# Patient Record
Sex: FEMALE | Race: OTHER | HISPANIC OR LATINO
[De-identification: names, ages, dates, MRNs, and addresses within clinical notes are randomized per-mention and may not be internally consistent; named-entity substitution may affect disease eponyms.]

---

## 2017-08-10 ENCOUNTER — RX RENEWAL (OUTPATIENT)
Age: 37
End: 2017-08-10

## 2017-11-20 ENCOUNTER — RX RENEWAL (OUTPATIENT)
Age: 37
End: 2017-11-20

## 2018-05-17 ENCOUNTER — LABORATORY RESULT (OUTPATIENT)
Age: 38
End: 2018-05-17

## 2018-05-17 ENCOUNTER — APPOINTMENT (OUTPATIENT)
Dept: OBGYN | Facility: CLINIC | Age: 38
End: 2018-05-17
Payer: MEDICARE

## 2018-05-17 VITALS
BODY MASS INDEX: 19.31 KG/M2 | SYSTOLIC BLOOD PRESSURE: 116 MMHG | DIASTOLIC BLOOD PRESSURE: 70 MMHG | HEIGHT: 63 IN | WEIGHT: 109 LBS

## 2018-05-17 DIAGNOSIS — Z01.419 ENCOUNTER FOR GYNECOLOGICAL EXAMINATION (GENERAL) (ROUTINE) W/OUT ABNORMAL FINDINGS: ICD-10-CM

## 2018-05-17 DIAGNOSIS — R68.82 DECREASED LIBIDO: ICD-10-CM

## 2018-05-17 PROCEDURE — 99395 PREV VISIT EST AGE 18-39: CPT

## 2018-06-06 ENCOUNTER — APPOINTMENT (OUTPATIENT)
Dept: UROGYNECOLOGY | Facility: CLINIC | Age: 38
End: 2018-06-06
Payer: MEDICARE

## 2018-06-06 DIAGNOSIS — N76.3 SUBACUTE AND CHRONIC VULVITIS: ICD-10-CM

## 2018-06-06 DIAGNOSIS — N82.3 FISTULA OF VAGINA TO LARGE INTESTINE: ICD-10-CM

## 2018-06-06 DIAGNOSIS — K50.90 CROHN'S DISEASE, UNSPECIFIED, W/OUT COMPLICATIONS: ICD-10-CM

## 2018-06-06 DIAGNOSIS — O03.9 COMPLETE OR UNSPECIFIED SPONTANEOUS ABORTION W/OUT COMPLICATION: ICD-10-CM

## 2018-06-06 PROCEDURE — 99214 OFFICE O/P EST MOD 30 MIN: CPT

## 2018-07-10 ENCOUNTER — RX RENEWAL (OUTPATIENT)
Age: 38
End: 2018-07-10

## 2018-07-31 ENCOUNTER — RX RENEWAL (OUTPATIENT)
Age: 38
End: 2018-07-31

## 2018-12-13 ENCOUNTER — RX RENEWAL (OUTPATIENT)
Age: 38
End: 2018-12-13

## 2020-03-26 ENCOUNTER — APPOINTMENT (OUTPATIENT)
Dept: OBGYN | Facility: CLINIC | Age: 40
End: 2020-03-26

## 2020-12-16 PROBLEM — Z01.419 ENCOUNTER FOR GYNECOLOGICAL EXAMINATION WITH PAPANICOLAOU SMEAR OF CERVIX: Status: RESOLVED | Noted: 2018-05-17 | Resolved: 2020-12-16

## 2021-03-19 ENCOUNTER — APPOINTMENT (OUTPATIENT)
Dept: OBGYN | Facility: CLINIC | Age: 41
End: 2021-03-19
Payer: MEDICARE

## 2021-03-19 ENCOUNTER — INPATIENT (INPATIENT)
Facility: HOSPITAL | Age: 41
LOS: 3 days | Discharge: LEFT BEFORE DISCHARGE | DRG: 330 | End: 2021-03-23
Attending: STUDENT IN AN ORGANIZED HEALTH CARE EDUCATION/TRAINING PROGRAM | Admitting: STUDENT IN AN ORGANIZED HEALTH CARE EDUCATION/TRAINING PROGRAM
Payer: MEDICARE

## 2021-03-19 VITALS
BODY MASS INDEX: 21.97 KG/M2 | WEIGHT: 124 LBS | SYSTOLIC BLOOD PRESSURE: 108 MMHG | TEMPERATURE: 97.3 F | DIASTOLIC BLOOD PRESSURE: 60 MMHG

## 2021-03-19 VITALS
OXYGEN SATURATION: 99 % | RESPIRATION RATE: 20 BRPM | SYSTOLIC BLOOD PRESSURE: 127 MMHG | DIASTOLIC BLOOD PRESSURE: 81 MMHG | HEART RATE: 99 BPM | TEMPERATURE: 99 F | HEIGHT: 63 IN | WEIGHT: 125 LBS

## 2021-03-19 DIAGNOSIS — Z01.419 ENCOUNTER FOR GYNECOLOGICAL EXAMINATION (GENERAL) (ROUTINE) W/OUT ABNORMAL FINDINGS: ICD-10-CM

## 2021-03-19 DIAGNOSIS — N83.209 UNSPECIFIED OVARIAN CYST, UNSPECIFIED SIDE: ICD-10-CM

## 2021-03-19 DIAGNOSIS — N81.4 UTEROVAGINAL PROLAPSE, UNSPECIFIED: ICD-10-CM

## 2021-03-19 DIAGNOSIS — R92.2 INCONCLUSIVE MAMMOGRAM: ICD-10-CM

## 2021-03-19 DIAGNOSIS — L02.31 CUTANEOUS ABSCESS OF BUTTOCK: ICD-10-CM

## 2021-03-19 DIAGNOSIS — Z87.42 PERSONAL HISTORY OF OTHER DISEASES OF THE FEMALE GENITAL TRACT: ICD-10-CM

## 2021-03-19 DIAGNOSIS — K61.39 OTHER ISCHIORECTAL ABSCESS: ICD-10-CM

## 2021-03-19 LAB
ALBUMIN SERPL ELPH-MCNC: 3.8 G/DL — SIGNIFICANT CHANGE UP (ref 3.3–5)
ALP SERPL-CCNC: 86 U/L — SIGNIFICANT CHANGE UP (ref 40–120)
ALT FLD-CCNC: 16 U/L — SIGNIFICANT CHANGE UP (ref 10–45)
ANION GAP SERPL CALC-SCNC: 15 MMOL/L — SIGNIFICANT CHANGE UP (ref 5–17)
APPEARANCE UR: CLEAR — SIGNIFICANT CHANGE UP
AST SERPL-CCNC: 19 U/L — SIGNIFICANT CHANGE UP (ref 10–40)
BACTERIA # UR AUTO: NEGATIVE — SIGNIFICANT CHANGE UP
BASOPHILS # BLD AUTO: 0.08 K/UL — SIGNIFICANT CHANGE UP (ref 0–0.2)
BASOPHILS NFR BLD AUTO: 0.3 % — SIGNIFICANT CHANGE UP (ref 0–2)
BILIRUB SERPL-MCNC: 0.4 MG/DL — SIGNIFICANT CHANGE UP (ref 0.2–1.2)
BILIRUB UR-MCNC: NEGATIVE — SIGNIFICANT CHANGE UP
BLD GP AB SCN SERPL QL: NEGATIVE — SIGNIFICANT CHANGE UP
BUN SERPL-MCNC: 17 MG/DL — SIGNIFICANT CHANGE UP (ref 7–23)
CALCIUM SERPL-MCNC: 9 MG/DL — SIGNIFICANT CHANGE UP (ref 8.4–10.5)
CHLORIDE SERPL-SCNC: 102 MMOL/L — SIGNIFICANT CHANGE UP (ref 96–108)
CO2 SERPL-SCNC: 21 MMOL/L — LOW (ref 22–31)
COLOR SPEC: COLORLESS — SIGNIFICANT CHANGE UP
CREAT SERPL-MCNC: 0.83 MG/DL — SIGNIFICANT CHANGE UP (ref 0.5–1.3)
DIFF PNL FLD: ABNORMAL
EOSINOPHIL # BLD AUTO: 0.17 K/UL — SIGNIFICANT CHANGE UP (ref 0–0.5)
EOSINOPHIL NFR BLD AUTO: 0.7 % — SIGNIFICANT CHANGE UP (ref 0–6)
EPI CELLS # UR: 0 /HPF — SIGNIFICANT CHANGE UP
GLUCOSE SERPL-MCNC: 90 MG/DL — SIGNIFICANT CHANGE UP (ref 70–99)
GLUCOSE UR QL: NEGATIVE — SIGNIFICANT CHANGE UP
HCG SERPL-ACNC: <2 MIU/ML — SIGNIFICANT CHANGE UP
HCT VFR BLD CALC: 31.2 % — LOW (ref 34.5–45)
HGB BLD-MCNC: 8.9 G/DL — LOW (ref 11.5–15.5)
HYALINE CASTS # UR AUTO: 0 /LPF — SIGNIFICANT CHANGE UP (ref 0–2)
IMM GRANULOCYTES NFR BLD AUTO: 1.4 % — SIGNIFICANT CHANGE UP (ref 0–1.5)
KETONES UR-MCNC: NEGATIVE — SIGNIFICANT CHANGE UP
LEUKOCYTE ESTERASE UR-ACNC: NEGATIVE — SIGNIFICANT CHANGE UP
LYMPHOCYTES # BLD AUTO: 0.81 K/UL — LOW (ref 1–3.3)
LYMPHOCYTES # BLD AUTO: 3.5 % — LOW (ref 13–44)
MCHC RBC-ENTMCNC: 22.2 PG — LOW (ref 27–34)
MCHC RBC-ENTMCNC: 28.5 GM/DL — LOW (ref 32–36)
MCV RBC AUTO: 77.8 FL — LOW (ref 80–100)
MONOCYTES # BLD AUTO: 0.84 K/UL — SIGNIFICANT CHANGE UP (ref 0–0.9)
MONOCYTES NFR BLD AUTO: 3.6 % — SIGNIFICANT CHANGE UP (ref 2–14)
NEUTROPHILS # BLD AUTO: 20.84 K/UL — HIGH (ref 1.8–7.4)
NEUTROPHILS NFR BLD AUTO: 90.5 % — HIGH (ref 43–77)
NITRITE UR-MCNC: NEGATIVE — SIGNIFICANT CHANGE UP
NRBC # BLD: 0 /100 WBCS — SIGNIFICANT CHANGE UP (ref 0–0)
PH UR: 6 — SIGNIFICANT CHANGE UP (ref 5–8)
PLATELET # BLD AUTO: 389 K/UL — SIGNIFICANT CHANGE UP (ref 150–400)
POTASSIUM SERPL-MCNC: 3.6 MMOL/L — SIGNIFICANT CHANGE UP (ref 3.5–5.3)
POTASSIUM SERPL-SCNC: 3.6 MMOL/L — SIGNIFICANT CHANGE UP (ref 3.5–5.3)
PROT SERPL-MCNC: 7.5 G/DL — SIGNIFICANT CHANGE UP (ref 6–8.3)
PROT UR-MCNC: NEGATIVE — SIGNIFICANT CHANGE UP
RBC # BLD: 4.01 M/UL — SIGNIFICANT CHANGE UP (ref 3.8–5.2)
RBC # FLD: 17.7 % — HIGH (ref 10.3–14.5)
RBC CASTS # UR COMP ASSIST: 8 /HPF — HIGH (ref 0–4)
RH IG SCN BLD-IMP: NEGATIVE — SIGNIFICANT CHANGE UP
SARS-COV-2 RNA SPEC QL NAA+PROBE: SIGNIFICANT CHANGE UP
SODIUM SERPL-SCNC: 138 MMOL/L — SIGNIFICANT CHANGE UP (ref 135–145)
SP GR SPEC: 1.01 — LOW (ref 1.01–1.02)
UROBILINOGEN FLD QL: NEGATIVE — SIGNIFICANT CHANGE UP
WBC # BLD: 23.07 K/UL — HIGH (ref 3.8–10.5)
WBC # FLD AUTO: 23.07 K/UL — HIGH (ref 3.8–10.5)
WBC UR QL: 1 /HPF — SIGNIFICANT CHANGE UP (ref 0–5)

## 2021-03-19 PROCEDURE — 74177 CT ABD & PELVIS W/CONTRAST: CPT | Mod: 26,MA

## 2021-03-19 PROCEDURE — 99396 PREV VISIT EST AGE 40-64: CPT | Mod: 25

## 2021-03-19 PROCEDURE — 99222 1ST HOSP IP/OBS MODERATE 55: CPT

## 2021-03-19 PROCEDURE — 99285 EMERGENCY DEPT VISIT HI MDM: CPT

## 2021-03-19 PROCEDURE — 99072 ADDL SUPL MATRL&STAF TM PHE: CPT

## 2021-03-19 PROCEDURE — 99213 OFFICE O/P EST LOW 20 MIN: CPT | Mod: 25

## 2021-03-19 RX ORDER — CIPROFLOXACIN LACTATE 400MG/40ML
400 VIAL (ML) INTRAVENOUS ONCE
Refills: 0 | Status: COMPLETED | OUTPATIENT
Start: 2021-03-19 | End: 2021-03-19

## 2021-03-19 RX ORDER — METRONIDAZOLE 500 MG
500 TABLET ORAL EVERY 8 HOURS
Refills: 0 | Status: DISCONTINUED | OUTPATIENT
Start: 2021-03-19 | End: 2021-03-20

## 2021-03-19 RX ORDER — METRONIDAZOLE 500 MG
TABLET ORAL
Refills: 0 | Status: DISCONTINUED | OUTPATIENT
Start: 2021-03-19 | End: 2021-03-20

## 2021-03-19 RX ORDER — ENOXAPARIN SODIUM 100 MG/ML
40 INJECTION SUBCUTANEOUS DAILY
Refills: 0 | Status: DISCONTINUED | OUTPATIENT
Start: 2021-03-19 | End: 2021-03-20

## 2021-03-19 RX ORDER — CIPROFLOXACIN LACTATE 400MG/40ML
VIAL (ML) INTRAVENOUS
Refills: 0 | Status: DISCONTINUED | OUTPATIENT
Start: 2021-03-19 | End: 2021-03-20

## 2021-03-19 RX ORDER — METRONIDAZOLE 500 MG
500 TABLET ORAL ONCE
Refills: 0 | Status: COMPLETED | OUTPATIENT
Start: 2021-03-19 | End: 2021-03-19

## 2021-03-19 RX ORDER — INFLUENZA VIRUS VACCINE 15; 15; 15; 15 UG/.5ML; UG/.5ML; UG/.5ML; UG/.5ML
0.5 SUSPENSION INTRAMUSCULAR ONCE
Refills: 0 | Status: DISCONTINUED | OUTPATIENT
Start: 2021-03-19 | End: 2021-03-23

## 2021-03-19 RX ORDER — SODIUM CHLORIDE 9 MG/ML
1000 INJECTION, SOLUTION INTRAVENOUS
Refills: 0 | Status: DISCONTINUED | OUTPATIENT
Start: 2021-03-19 | End: 2021-03-20

## 2021-03-19 RX ORDER — CIPROFLOXACIN LACTATE 400MG/40ML
400 VIAL (ML) INTRAVENOUS EVERY 12 HOURS
Refills: 0 | Status: DISCONTINUED | OUTPATIENT
Start: 2021-03-20 | End: 2021-03-20

## 2021-03-19 RX ADMIN — Medication 200 MILLIGRAM(S): at 20:09

## 2021-03-19 RX ADMIN — ENOXAPARIN SODIUM 40 MILLIGRAM(S): 100 INJECTION SUBCUTANEOUS at 22:41

## 2021-03-19 RX ADMIN — Medication 100 MILLIGRAM(S): at 18:26

## 2021-03-19 NOTE — REVIEW OF SYSTEMS
[Negative] : Heme/Lymph [FreeTextEntry2] : + chills yesterday  [FreeTextEntry1] : +gluteal pain since yesterday, +fullness in vagina

## 2021-03-19 NOTE — ED ADULT NURSE NOTE - OBJECTIVE STATEMENT
40 year old female pt presented to the ED stating PMD sent for redness to right buttock "abscess", pt states redness and discomfort started about 4 weeks ago and getting progressively worse, denies any fever states chills last night, no nausea no vomiting no diarrhea, lung field cta, pt with a H/O Crohn's, pt is ambulatory A&OX3, no discharge noted, no bleeding

## 2021-03-19 NOTE — ED PROVIDER NOTE - CLINICAL SUMMARY MEDICAL DECISION MAKING FREE TEXT BOX
41 yo F with a pmhx of Crohn's disease present to the ED after being sent in by her OBGYN Dr. Hurtado. Patient has been having an abscess form over her buttock over the past month and has increased in size. VSS. PE significant for a 3cm ischiorectal abscess. will order labs, imaging, surg consult

## 2021-03-19 NOTE — H&P ADULT - NSHPPHYSICALEXAM_GEN_ALL_CORE
Vital Signs Last 24 Hrs  T(C): 37.3 (19 Mar 2021 10:57), Max: 37.3 (19 Mar 2021 10:57)  T(F): 99.1 (19 Mar 2021 10:57), Max: 99.1 (19 Mar 2021 10:57)  HR: 92 (19 Mar 2021 12:00) (92 - 99)  BP: 134/73 (19 Mar 2021 12:00) (127/81 - 134/73)  BP(mean): --  RR: 16 (19 Mar 2021 12:00) (16 - 20)  SpO2: 100% (19 Mar 2021 12:00) (99% - 100%)    Exam:  Gen: NAD, resting in bed, alert and responding appropriately  Resp: Airway patent, non-labored respirations  Abd: Soft, ND, NT, no rebound or guarding. Incisions c/d/i  Ext: No edema, WWP  Neuro: AAOx3, no focal deficits

## 2021-03-19 NOTE — H&P ADULT - NSHPLABSRESULTS_GEN_ALL_CORE
MEDICATIONS  (PRN):    LABS:                        8.9    23.07 )-----------( 389      ( 19 Mar 2021 13:10 )             31.2     03-19    138  |  102  |  17  ----------------------------<  90  3.6   |  21<L>  |  0.83    Ca    9.0      19 Mar 2021 13:10    TPro  7.5  /  Alb  3.8  /  TBili  0.4  /  DBili  x   /  AST  19  /  ALT  16  /  AlkPhos  86  03-19    PT/INR - ( 19 Mar 2021 15:49 )   PT: 17.3 sec;   INR: 1.47 ratio       < from: CT Abdomen and Pelvis w/ IV Cont (03.19.21 @ 14:55) >    FINDINGS:  LOWER CHEST: Within normal limits.    LIVER: Within normal limits.  BILE DUCTS: Normal caliber.  GALLBLADDER: Within normal limits.  SPLEEN: Within normal limits.  PANCREAS: Within normal limits.  ADRENALS: Within normal limits.  KIDNEYS/URETERS: Within normal limits.    BLADDER: Within normal limits.  REPRODUCTIVE ORGANS: Uterus and adnexa within normal limits. 1.3 cm right ovarian follicle.    BOWEL: No bowel obstruction. Total colectomy with ileal anal pull-through.    PERITONEUM: No ascites.  VESSELS: Within normal limits.  RETROPERITONEUM/LYMPH NODES: No lymphadenopathy.  ABDOMINAL WALL: Within the perirenal fat of the right buttocks there is a complex fluid and air collection with several narrow tracts. A primarily fluid-filled tract extends from the central perianal fat to the skin surface at the right gluteal fold and this measures approximately 7.0 x 1.7 cm (6:60). Another tract filled with air extends laterally and measures 2.4 x 1.1 cm (6:54).  Another tract extends cranially to the right levator ani muscle. This measures approximately 6.0 x 0.7 cm (6:50). The right levator ani muscle is mildly thickened. No involvement is seen within the intraperitoneal space adjacent to this at this time. A small tract also extends to the right lateral aspect of the lower anus. An anal fistula cannot be excluded.    BONES: Within normal limits.    IMPRESSION:    Within the fat of the right buttocks adjacent to the anus there is a ill-defined collection of fluid and air with at least 4 sinus tracts as described above. The presence of air suggests either a gas forming organism or fistulization to the anus and/or skin surface. This would be better evaluated with dedicated contrast-enhanced anal fistula MRI examination.      < end of copied text >

## 2021-03-19 NOTE — H&P ADULT - ASSESSMENT
40F with hx of Crohns, on prednisone , s/p total colectomy w IPAA presents with perianal abscess, with multiple fistula tracts on CT    Recommendation:  - Admit to colorectal surgery, Dr. Webster  - Added on for OR for EUS tomorrow  - Preop, COVID swab, pregnancy test  - Reg diet, NPOpMN  - consent to be obtained  - discussed with attending    [id number]   40F with hx of Crohns, on prednisone , s/p total colectomy w IPAA presents with perianal abscess, with multiple fistula tracts on CT    Recommendation:  - Admit to colorectal surgery, Dr. Webster  - Added on for OR for EUS tomorrow  - Preop, COVID swab, pregnancy test  - Reg diet, NPOpMN  - consent to be obtained  - GI consult for Crohn's management  - antibiotics with cipro, flagyl  - discussed with attending    [id number]   40F with hx of Crohns, on prednisone , s/p total colectomy w IPAA presents with perianal abscess, with multiple fistula tracts on CT    Recommendation:  - Admit to colorectal surgery, Dr. Webster  - Added on for OR for EUA tomorrow  - Preop, COVID swab, pregnancy test  - Reg diet, NPOpMN  - consent to be obtained  - GI consult for Crohn's management  - antibiotics with cipro, flagyl  - discussed with attending    [id number]

## 2021-03-19 NOTE — ED PROVIDER NOTE - NS ED ROS FT
GENERAL: no fever, chills  HEENT: no cough, congestion, odynophagia, dysphagia  CARDIAC: no chest pain, palpitations, lightheadedness  PULM: no dyspnea, wheezing   GI: no abdominal pain, nausea, vomiting, diarrhea, constipation, melena, hematochezia  : no urinary dysuria, frequency, incontinence, hematuria  NEURO: no headache, motor weakness, sensory changes  MSK: no joint or muscle pain  SKIN: (+) abscess   HEME: no active bleeding, bruising

## 2021-03-19 NOTE — PHYSICAL EXAM
[Examination Of The Breasts] : a normal appearance [No Masses] : no breast masses were palpable [Labia Majora] : normal [Labia Minora] : normal [Normal] : normal [Uterine Adnexae] : normal [FreeTextEntry1] : +right gluteal abscess, + induration, +erythema, appears to be coming to a head, no drainage seen, fullness   [FreeTextEntry4] : + stool in the vagina,  [FreeTextEntry6] : third degree prolapse

## 2021-03-19 NOTE — ED PROVIDER NOTE - PHYSICAL EXAMINATION
GENERAL: no acute distress, non-toxic appearing  HEAD: normocephalic, atraumatic  HEENT: normal conjunctiva, oral mucosa moist, neck supple  CARDIAC: regular rate and rhythm, normal S1 and S2,  no appreciable murmurs  PULM: clear to ascultation bilaterally, no crackles, rales, rhonchi, or wheezing    GI: abdomen nondistended, soft, nontender, no guarding or rebound tenderness, (+) 3cm ischiorectal abscess    : no CVA tenderness, no suprapubic tenderness  NEURO: alert and oriented x 3, normal speech, PERRLA, EOMI, no focal motor or sensory deficits, nonantalgic gait  MSK: no visible deformities, no peripheral edema, calf tenderness/redness/swelling  SKIN: no visible rashes, dry, well-perfused  PSYCH: appropriate mood and affect

## 2021-03-19 NOTE — PATIENT PROFILE ADULT - STATED REASON FOR ADMISSION
PRIMARY CARE CLINIC FOLLOW UP VISIT  Chief Complaint   Patient presents with   • Asthma     History of Present Illness     Asthma, moderate persistent, well-controlled  Needs a refill of her advair. Also needs albuterol for her nebulizer in preparation for winter time, hasn't needed to use it much.     Current Outpatient Medications   Medication Sig Dispense Refill   • fluticasone-salmeterol (ADVAIR) 100-50 MCG/DOSE AEROSOL POWDER, BREATH ACTIVATED Inhale 1 Puff by mouth every 12 hours.     • fluticasone-salmeterol (ADVAIR) 100-50 MCG/DOSE AEROSOL POWDER, BREATH ACTIVATED Inhale 1 Puff by mouth every 12 hours. 1 Inhaler 3   • albuterol (PROVENTIL) 2.5mg/3ml Nebu Soln solution for nebulization 3 mL by Nebulization route every four hours as needed for Shortness of Breath. 30 Bullet 1   • PROAIR  (90 Base) MCG/ACT Aero Soln inhalation aerosol INHALE TWO PUFFS BY MOUTH EVERY 6 HOURS AS NEEDED FOR SHORTNESS OF BREATH 3 Inhaler 1     No current facility-administered medications for this visit.      Past Medical History:   Diagnosis Date   • Asthma, moderate persistent, well-controlled 3/25/2015     Past Surgical History:   Procedure Laterality Date   • HYSTERECTOMY ROBOTIC  10/19/2011    Performed by SHERRY WHITE at SURGERY [facility] ORS   • CYSTOSCOPY  10/19/2011    Performed by SHERRY WHITE at SURGERY [facility] ORS   • VAGINAL PERINEAL EXAM REPAIR  10/19/2011    Performed by SHERRY WHITE at SURGERY [facility] ORS   • PRIMARY C SECTION      x2, (2000, 2003).     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 Has 2 daughters ages 15 and 18 as of 7/2018     Family History   Problem Relation Age of Onset   • Breast Cancer Paternal Grandmother         metastatic    •  "Hypertension Mother    • Cancer Maternal Grandfather    • Hypertension Maternal Grandfather      Family Status   Relation Name Status   • PGMo     • Mo  Alive   • Fa  Alive   • MGMo  Alive   • MGFa       Allergies: Patient has no known allergies.    ROS  As per HPI above. All other systems reviewed and negative.        Objective   /86   Pulse 80   Temp 36.4 °C (97.6 °F)   Resp 16   Ht 1.651 m (5' 5\")   Wt 77.7 kg (171 lb 6.4 oz)   SpO2 96%  Body mass index is 28.52 kg/m².    General: alert and oriented, pleasant, cooperative  HEENT: Normocephalic, atraumatic.   Psychiatric: appropriate mood and affect. Good insight and appropriate judgment     Assessment and Plan   The following treatment plan was discussed     1. Asthma, moderate persistent, well-controlled  Well controlled, given refill of generic advair and also albuterol nebulizer solution.     2. Hyperlipidemia, unspecified hyperlipidemia type    - Comp Metabolic Panel; Future  - CBC WITH DIFFERENTIAL; Future  - Lipid Profile; Future    3. Encounter for vitamin deficiency screening    - VITAMIN D,25 HYDROXY; Future      Does flu shot through her 's employer       Healthcare maintenance     Health Maintenance Due   Topic Date Due   • IMM INFLUENZA (1) 2019       No follow-ups on file.    Sincere Hurt MD  Internal Medicine  [facility]                   " ischiorectal abscess

## 2021-03-19 NOTE — ED CLERICAL - NS ED CLERK NOTE PRE-ARRIVAL INFORMATION; ADDITIONAL PRE-ARRIVAL INFORMATION
CC/Reason For referral:  hx Crohn's, rectal/vag fistula. presented for gluteal pain.  has a gluteal abcess and may need drainage  Preferred Consultant(if applicable):  Kamar Polanco  Who admits for you (if needed):  Do you have documents you would like to fax over?  Would you still like to speak to an ED attending?  please call after patient is seen

## 2021-03-19 NOTE — H&P ADULT - ATTENDING COMMENTS
Long standing history of Crohn's w/ known anovaginal fistula presenting now w/ concern for perirectal abscess after seeing her gynecologist. Previously tried on remicade, Humira and Ent w/ intolerance or reactions to them. Has been maintained on steroids for sometime (currently on 10mg a day of prednisone)    CT confirms suspicion of anal fistula and several tracts noted   Admit  Regular diet and NPO at midnight for OR tomorrow  OR tomorrow for EUA, seton placement  IV abx  GI consult to evaluate for any other medical options. Pt unable to afford things like Stelara so would like to ask GI whether or not there is a benefit in trying a 5-ASA, azathioprine or 6-MP    Te Webster MD  Attending Physician

## 2021-03-19 NOTE — H&P ADULT - HISTORY OF PRESENT ILLNESS
Patient is a 40y old  Female who presents with a chief complaint of R buttock swelling    HPI:  40 year old female with hx of Crohns (diagnosed ~20 years ago), on prednisone x 6 years (refractory to Humira, Entyvio, allergic to Remicade), s/p total colectomy w IPAA over 8 years ago at [place], known anovaginal fistula followed by Ob/Gyn Dr. Hurtado, sent from Ob/Gyn office for perianal abscess. Patient reports R gluteal swelling x 1 months, worse in past 2 days, associated with pain and pus from anus. Patient denies fevers, denies lightheadedness/dizziness, denies SOB/chest pain, denies nausea/vomiting, denies constipation/diarrhea

## 2021-03-19 NOTE — ED ADULT NURSE REASSESSMENT NOTE - NS ED NURSE REASSESS COMMENT FT1
Spoke to Jorge L from lab, after lab received specimen pt/ptt tube, they were unable to locate specimen. Manager aware. Will resend new specimen.

## 2021-03-19 NOTE — ED PROVIDER NOTE - OBJECTIVE STATEMENT
41 yo F with a pmhx of Crohn's disease present to the ED after being sent in by her OBGYN Dr. Hurtado. Patient has been having an abscess form over her buttock over the past month and has increased in size. She admits to pain on sitting down. She denies any bloody stools. She had most of her colon removed. She denies any CP, SOB, abdominal pain, fevers, chills, N/V/D.

## 2021-03-19 NOTE — DISCUSSION/SUMMARY
[FreeTextEntry1] : 41 y/o P2 LMP 2/25/21\par crohn's recovaginal fstula, prednisone 10mg qd GI at [place], Dr. Mehta\par +Gluteal abscess gluteal pain since yesterday, referred to ER [facility], advised colorectal evaluation (Dr. Jarvis), called ER to notify spoke with nurse Eliane \par Pap obtained\par Screening Mammogram \par Ovarian cyst, pelvic u/S\par Uterine prolapse, can cosnider hysterectomy however, poor surgical candidate, wouldn't recommedn pessarydue to R/V fistula \par Patient sent to ER. advised to call regarding follow up in ER  \par

## 2021-03-19 NOTE — ED PROVIDER NOTE - ATTENDING CONTRIBUTION TO CARE
[place] - 41yo F w hx of Crohn's disease, recto-vaginal fistulas, present to the ED after being sent in by her OBGYN Dr. Hurtado. Patient has been having an abscess form over her buttock over the past month and has increased in size. She admits to pain on sitting down. She denies any bloody stools. She had most of her colon removed. She denies any CP, SOB, abdominal pain, fevers, chills, N/V/D. VS wnl, well appearing, in NAD. Moist mucosae, pink conjunctivae. Neck supple, neuro grossly intact. Lungs clear, cardiac wnl, no JVD. Abdomen soft/NT, no CVAT. R buttock w 3cm diam abscess w pos fluctuance. No pedal edema, no calf TTP. Will get labs, CT r/o fistula, Surgery consult, likely w ill DC w abx and Sx f/u

## 2021-03-20 LAB
ANION GAP SERPL CALC-SCNC: 12 MMOL/L — SIGNIFICANT CHANGE UP (ref 5–17)
APTT BLD: 34.1 SEC — SIGNIFICANT CHANGE UP (ref 27.5–35.5)
BUN SERPL-MCNC: 16 MG/DL — SIGNIFICANT CHANGE UP (ref 7–23)
CALCIUM SERPL-MCNC: 9.1 MG/DL — SIGNIFICANT CHANGE UP (ref 8.4–10.5)
CHLORIDE SERPL-SCNC: 104 MMOL/L — SIGNIFICANT CHANGE UP (ref 96–108)
CO2 SERPL-SCNC: 21 MMOL/L — LOW (ref 22–31)
COVID-19 SPIKE DOMAIN AB INTERP: POSITIVE
COVID-19 SPIKE DOMAIN ANTIBODY RESULT: >250 U/ML — HIGH
CREAT SERPL-MCNC: 0.89 MG/DL — SIGNIFICANT CHANGE UP (ref 0.5–1.3)
GLUCOSE SERPL-MCNC: 85 MG/DL — SIGNIFICANT CHANGE UP (ref 70–99)
HCG UR QL: NEGATIVE — SIGNIFICANT CHANGE UP
HCT VFR BLD CALC: 29.7 % — LOW (ref 34.5–45)
HGB BLD-MCNC: 8.9 G/DL — LOW (ref 11.5–15.5)
INR BLD: 1.58 RATIO — HIGH (ref 0.88–1.16)
MAGNESIUM SERPL-MCNC: 1.8 MG/DL — SIGNIFICANT CHANGE UP (ref 1.6–2.6)
MCHC RBC-ENTMCNC: 23 PG — LOW (ref 27–34)
MCHC RBC-ENTMCNC: 30 GM/DL — LOW (ref 32–36)
MCV RBC AUTO: 76.7 FL — LOW (ref 80–100)
NRBC # BLD: 0 /100 WBCS — SIGNIFICANT CHANGE UP (ref 0–0)
PHOSPHATE SERPL-MCNC: 2.9 MG/DL — SIGNIFICANT CHANGE UP (ref 2.5–4.5)
PLATELET # BLD AUTO: 372 K/UL — SIGNIFICANT CHANGE UP (ref 150–400)
POTASSIUM SERPL-MCNC: 3.9 MMOL/L — SIGNIFICANT CHANGE UP (ref 3.5–5.3)
POTASSIUM SERPL-SCNC: 3.9 MMOL/L — SIGNIFICANT CHANGE UP (ref 3.5–5.3)
PROTHROM AB SERPL-ACNC: 18.5 SEC — HIGH (ref 10.6–13.6)
RBC # BLD: 3.87 M/UL — SIGNIFICANT CHANGE UP (ref 3.8–5.2)
RBC # FLD: 17.6 % — HIGH (ref 10.3–14.5)
SARS-COV-2 IGG+IGM SERPL QL IA: >250 U/ML — HIGH
SARS-COV-2 IGG+IGM SERPL QL IA: POSITIVE
SODIUM SERPL-SCNC: 137 MMOL/L — SIGNIFICANT CHANGE UP (ref 135–145)
WBC # BLD: 18.8 K/UL — HIGH (ref 3.8–10.5)
WBC # FLD AUTO: 18.8 K/UL — HIGH (ref 3.8–10.5)

## 2021-03-20 PROCEDURE — 99222 1ST HOSP IP/OBS MODERATE 55: CPT

## 2021-03-20 RX ORDER — CIPROFLOXACIN LACTATE 400MG/40ML
500 VIAL (ML) INTRAVENOUS EVERY 12 HOURS
Refills: 0 | Status: DISCONTINUED | OUTPATIENT
Start: 2021-03-20 | End: 2021-03-21

## 2021-03-20 RX ORDER — OXYCODONE HYDROCHLORIDE 5 MG/1
5 TABLET ORAL EVERY 4 HOURS
Refills: 0 | Status: DISCONTINUED | OUTPATIENT
Start: 2021-03-20 | End: 2021-03-23

## 2021-03-20 RX ORDER — ENOXAPARIN SODIUM 100 MG/ML
40 INJECTION SUBCUTANEOUS DAILY
Refills: 0 | Status: DISCONTINUED | OUTPATIENT
Start: 2021-03-20 | End: 2021-03-23

## 2021-03-20 RX ORDER — ACETAMINOPHEN 500 MG
650 TABLET ORAL EVERY 6 HOURS
Refills: 0 | Status: DISCONTINUED | OUTPATIENT
Start: 2021-03-20 | End: 2021-03-23

## 2021-03-20 RX ORDER — ZINC OXIDE 200 MG/G
1 OINTMENT TOPICAL DAILY
Refills: 0 | Status: DISCONTINUED | OUTPATIENT
Start: 2021-03-20 | End: 2021-03-23

## 2021-03-20 RX ORDER — METRONIDAZOLE 500 MG
500 TABLET ORAL EVERY 8 HOURS
Refills: 0 | Status: DISCONTINUED | OUTPATIENT
Start: 2021-03-20 | End: 2021-03-21

## 2021-03-20 RX ADMIN — Medication 650 MILLIGRAM(S): at 21:47

## 2021-03-20 RX ADMIN — Medication 500 MILLIGRAM(S): at 17:55

## 2021-03-20 RX ADMIN — Medication 650 MILLIGRAM(S): at 21:17

## 2021-03-20 RX ADMIN — Medication 10 MILLIGRAM(S): at 05:12

## 2021-03-20 RX ADMIN — SODIUM CHLORIDE 75 MILLILITER(S): 9 INJECTION, SOLUTION INTRAVENOUS at 00:00

## 2021-03-20 RX ADMIN — ENOXAPARIN SODIUM 40 MILLIGRAM(S): 100 INJECTION SUBCUTANEOUS at 12:10

## 2021-03-20 RX ADMIN — Medication 500 MILLIGRAM(S): at 14:11

## 2021-03-20 RX ADMIN — Medication 500 MILLIGRAM(S): at 21:17

## 2021-03-20 RX ADMIN — Medication 500 MILLIGRAM(S): at 02:26

## 2021-03-20 RX ADMIN — ZINC OXIDE 1 APPLICATION(S): 200 OINTMENT TOPICAL at 14:11

## 2021-03-20 RX ADMIN — Medication 200 MILLIGRAM(S): at 07:57

## 2021-03-20 NOTE — PRE-ANESTHESIA EVALUATION ADULT - NSANTHASARD_GEN_ALL_CORE
Interventional Radiology Post Paracentesis/Thoracentesis Note    6/11/2018    Procedure(s): Ultrasound guided Therapeutic Paracentesis Performed with 8 Luxembourgish catheter total volume 6,000 ml. Preliminary Findings: large cloudy and tan, chylous. Complications: None    Plan:  Observation, check labs if drawn.           Chest X-Ray:  no    Full dictated report to follow    Signed By: Kwan Ramirez 2

## 2021-03-20 NOTE — PROGRESS NOTE ADULT - SUBJECTIVE AND OBJECTIVE BOX
Subjective:   Patient seen at bedside this AM. Reports feeling well, without complaints. Denies chest pain, SOB.     24h Events:   - Overnight, no acute events    Objective:  Vital Signs  T(C): 36.6 (03-20 @ 13:40), Max: 37.2 (03-20 @ 05:48)  HR: 60 (03-20 @ 13:40) (60 - 111)  BP: 104/60 (03-20 @ 13:40) (104/60 - 123/77)  RR: 17 (03-20 @ 13:40) (14 - 18)  SpO2: 97% (03-20 @ 13:40) (96% - 100%)  03-19-21 @ 07:01  -  03-20-21 @ 07:00  --------------------------------------------------------  IN:  Total IN: 0 mL    OUT:    Voided (mL): 400 mL  Total OUT: 400 mL    Total NET: -400 mL      03-20-21 @ 07:01  -  03-20-21 @ 14:11  --------------------------------------------------------  IN:  Total IN: 0 mL    OUT:    Voided (mL): 300 mL  Total OUT: 300 mL    Total NET: -300 mL      Physical Exam:  GEN: resting in bed comfortably in NAD  RESP: no increased WOB  ABD: soft, non-distended, non-tender without rebound tenderness or guarding  EXTR: warm, well-perfused without gross deformities; spontaneous movement in b/l U/L extrem  NEURO: awake, alert      Labs:             8.9    18.80 )-----------( 372      ( 20 Mar 2021 06:15 )             29.7   03-20    137  |  104  |  16  ----------------------------<  85  3.9   |  21<L>  |  0.89    Ca    9.1      20 Mar 2021 06:14  Phos  2.9     03-20  Mg     1.8     03-20    TPro  7.5  /  Alb  3.8  /  TBili  0.4  /  DBili  x   /  AST  19  /  ALT  16  /  AlkPhos  86  03-19      Medications:   MEDICATIONS  (STANDING):  acetaminophen   Tablet .. 650 milliGRAM(s) Oral every 6 hours  ciprofloxacin     Tablet 500 milliGRAM(s) Oral every 12 hours  enoxaparin Injectable 40 milliGRAM(s) SubCutaneous daily  influenza   Vaccine 0.5 milliLiter(s) IntraMuscular once  metroNIDAZOLE    Tablet 500 milliGRAM(s) Oral every 8 hours  predniSONE   Tablet 10 milliGRAM(s) Oral daily  zinc oxide 20% Ointment 1 Application(s) Topical daily    MEDICATIONS  (PRN):  oxyCODONE    IR 5 milliGRAM(s) Oral every 4 hours PRN Severe Pain (7 - 10)      Assessment:   41yo Female with Hx of Crohn's (on prednisone) s/p total colectomy with IPAA who presents with perianal abscess. OR today for EUA.       Plan:   - OR today for EUA   - NPO for OR / IVF  - GI c/s today for Crohn's mgmt  - Abx: Cipro, Flagyl  - Lovenox for VTE ppx      Makenzie Oro, PGY-1  Red Surgery  #[id number] Subjective:   Patient seen at bedside this AM. Reports feeling well, without complaints. Denies chest pain, SOB.     24h Events:   - Overnight, no acute events    Objective:  Vital Signs  T(C): 36.6 (03-20 @ 13:40), Max: 37.2 (03-20 @ 05:48)  HR: 60 (03-20 @ 13:40) (60 - 111)  BP: 104/60 (03-20 @ 13:40) (104/60 - 123/77)  RR: 17 (03-20 @ 13:40) (14 - 18)  SpO2: 97% (03-20 @ 13:40) (96% - 100%)  03-19-21 @ 07:01  -  03-20-21 @ 07:00  --------------------------------------------------------  IN:  Total IN: 0 mL    OUT:    Voided (mL): 400 mL  Total OUT: 400 mL    Total NET: -400 mL      03-20-21 @ 07:01  -  03-20-21 @ 14:11  --------------------------------------------------------  IN:  Total IN: 0 mL    OUT:    Voided (mL): 300 mL  Total OUT: 300 mL    Total NET: -300 mL      Physical Exam:  GEN: resting in bed comfortably in NAD  RESP: no increased WOB  ABD: soft, non-distended, non-tender without rebound tenderness or guarding  EXTR: warm, well-perfused without gross deformities; spontaneous movement in b/l U/L extrem  NEURO: awake, alert      Labs:             8.9    18.80 )-----------( 372      ( 20 Mar 2021 06:15 )             29.7   03-20    137  |  104  |  16  ----------------------------<  85  3.9   |  21<L>  |  0.89    Ca    9.1      20 Mar 2021 06:14  Phos  2.9     03-20  Mg     1.8     03-20    TPro  7.5  /  Alb  3.8  /  TBili  0.4  /  DBili  x   /  AST  19  /  ALT  16  /  AlkPhos  86  03-19      Medications:   MEDICATIONS  (STANDING):  acetaminophen   Tablet .. 650 milliGRAM(s) Oral every 6 hours  ciprofloxacin     Tablet 500 milliGRAM(s) Oral every 12 hours  enoxaparin Injectable 40 milliGRAM(s) SubCutaneous daily  influenza   Vaccine 0.5 milliLiter(s) IntraMuscular once  metroNIDAZOLE    Tablet 500 milliGRAM(s) Oral every 8 hours  predniSONE   Tablet 10 milliGRAM(s) Oral daily  zinc oxide 20% Ointment 1 Application(s) Topical daily    MEDICATIONS  (PRN):  oxyCODONE    IR 5 milliGRAM(s) Oral every 4 hours PRN Severe Pain (7 - 10)      Assessment:   41yo Female with Hx of Crohn's (on prednisone) s/p total colectomy with IPAA who presents with perianal abscess. OR today for EUA.       Plan:   - OR today for EUA   - NPO for OR / IVF  - Abx: Cipro, Flagyl  - Pain control PRN: Tylenol, Ox 2.5/5  - Home meds: prednisone  - Lovenox for VTE ppx  - GI c/s today for Crohn's mgmt      Makenzie Oro, PGY-1  Red Surgery  #[id number]

## 2021-03-20 NOTE — CONSULT NOTE ADULT - SUBJECTIVE AND OBJECTIVE BOX
Chief Complaint:  Patient is a 40y old  Female who presents with a chief complaint of Perianal abscess (20 Mar 2021 08:11)      HPI:BING FIGUEROA is a 40y Female w/ hx of Crohn's disease presenting from OB/Gyn clinic for perianal abscess. Pt dx w/ Crohn's > 20 years ago. She was initially on prednisone, then tried remicade but had allergic reaction, was on Humira for 6 years but ended up with total colectomy w/ IPAA while on Humira, tried Entivyo while she was pregnant w/o improvement, and has now been on prednisone 10-20mg per day for the last 6 years. She follows w/ [facility] and is now on prednisone 10mg daily. She has a known anovaginal fistula.    She reported 1 month of swellling on her buttock that over the last 2-3 days developed into an abscess. She reports pain and some anal pus drainage. She denies f/c. She overall is feeling well - she denies n/v, abd pain, change in bowel habits, melena, hematochezia.     She underwent EUA with colorectal surgery today with 3 setons placed in separate fistula tracts, one communicating with the vagina.    PMHX/PSHX:  Crohn disease      Allergies:  Remicade (Short breath)      Home Medications: reviewed  Hospital Medications:  acetaminophen   Tablet .. 650 milliGRAM(s) Oral every 6 hours  ciprofloxacin     Tablet 500 milliGRAM(s) Oral every 12 hours  enoxaparin Injectable 40 milliGRAM(s) SubCutaneous daily  influenza   Vaccine 0.5 milliLiter(s) IntraMuscular once  metroNIDAZOLE    Tablet 500 milliGRAM(s) Oral every 8 hours  oxyCODONE    IR 5 milliGRAM(s) Oral every 4 hours PRN  predniSONE   Tablet 10 milliGRAM(s) Oral daily  zinc oxide 20% Ointment 1 Application(s) Topical daily      Social History:   Tob: Denies  EtOH: Denies  Illicit Drugs: Denies    Family history:    Denies family history of colon cancer/polyps, stomach cancer/polyps, pancreatic cancer/masses, liver cancer/disease, ovarian cancer and endometrial cancer.    ROS:   General:  No  fevers, chills, night sweats, fatigue  Eyes:  Good vision, no reported pain  ENT:  No sore throat, pain, runny nose  CV:  No pain, palpitations  Pulm:  No dyspnea, cough  GI:  See HPI, otherwise negative  :  No  incontinence, nocturia  Muscle:  No pain, weakness  Neuro:  No memory problems  Psych:  No insomnia, mood problems, depression  Endocrine:  No polyuria, polydipsia, cold/heat intolerance  Heme:  No petechiae, ecchymosis, easy bruisability  Skin:  No rash    PHYSICAL EXAM:   Vital Signs:  Vital Signs Last 24 Hrs  T(C): 36.7 (20 Mar 2021 14:39), Max: 37.2 (20 Mar 2021 05:48)  T(F): 98.1 (20 Mar 2021 14:39), Max: 98.9 (20 Mar 2021 05:48)  HR: 70 (20 Mar 2021 14:39) (60 - 111)  BP: 95/61 (20 Mar 2021 14:39) (95/61 - 123/77)  BP(mean): 76 (20 Mar 2021 11:45) (76 - 84)  RR: 17 (20 Mar 2021 14:39) (14 - 18)  SpO2: 98% (20 Mar 2021 14:39) (96% - 100%)  Daily Height in cm: 160.02 (20 Mar 2021 08:28)    Daily     GENERAL: no acute distress  NEURO: alert  HEENT: anicteric sclera, no conjunctival pallor appreciated  CHEST: no respiratory distress, no accessory muscle use  CARDIAC: regular rate, rhythm  ABDOMEN: soft, non-tender, non-distended, no rebound or guarding  EXTREMITIES: warm, well perfused, no edema  SKIN: no lesions noted    LABS: reviewed                        8.9    18.80 )-----------( 372      ( 20 Mar 2021 06:15 )             29.7     03-20    137  |  104  |  16  ----------------------------<  85  3.9   |  21<L>  |  0.89    Ca    9.1      20 Mar 2021 06:14  Phos  2.9     03-20  Mg     1.8     03-20    TPro  7.5  /  Alb  3.8  /  TBili  0.4  /  DBili  x   /  AST  19  /  ALT  16  /  AlkPhos  86  03-19    LIVER FUNCTIONS - ( 19 Mar 2021 13:10 )  Alb: 3.8 g/dL / Pro: 7.5 g/dL / ALK PHOS: 86 U/L / ALT: 16 U/L / AST: 19 U/L / GGT: x               Diagnostic Studies: see sunrise for full report

## 2021-03-20 NOTE — BRIEF OPERATIVE NOTE - COMMENTS
Patient tolerated procedure well, was extubated without complication and transferred to the PACU in hemodynamically stable condition

## 2021-03-20 NOTE — CONSULT NOTE ADULT - ATTENDING COMMENTS
Agree with above. 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 Continue Abxs. Patient will need to follow up with her primary GI at [facility]. She has been treated chronically w prednisone. By hx was on Remicade, Humira, Entyvio.

## 2021-03-20 NOTE — CONSULT NOTE ADULT - ASSESSMENT
40F w/ long-standing Crohn's disease, having failed multiple biologic therapies, s/p total colectomy w/ IPAA, on chronic prednisone, known anovaginal fistula, presenting w/ perianal fistula and found to have 3 fistulous tracts s/p seton placement by colorectal surgery.    Impression:  #Fistulizing Crohn's disease on prednisone    Recommendations:  - Would continue w/ prednisone 10mg daily for now. Would not acutely change medical management of Crohn's in this complex patient who has an established GI  at [facility] who knows her well.  - Agree w/ cipro/flagyl  - Management of fitulas per colorectal surgery  - Can consider fecal stream diversion w/ ostomy, but would defer to surgery primary GI  at [facility]  - Trend WBC  - Low residue diet    Bud Chua  Gastroenterology Fellow  NON-URGENT CONSULTS:  Please email [email or web address]  [email or web address]  AT NIGHT AND ON WEEKENDS:  Contact on-call GI fellow via answering service ([phone]) from 5pm-8am and on weekends/holidays  MONDAY-FRIDAY 8AM-5PM:  Available via [employer] Teams  Pager# [phone] ([facility]) or [phone] ([facility])  GI Phone# [phone] ([facility])

## 2021-03-20 NOTE — PROVIDER CONTACT NOTE (OTHER) - ASSESSMENT
Pt is asymptomatic   no c/o 0f feeling dizzy Cheek Interpolation Flap Text: A decision was made to reconstruct the defect utilizing an interpolation axial flap and a staged reconstruction.  A telfa template was made of the defect.  This telfa template was then used to outline the Cheek Interpolation flap.  The donor area for the pedicle flap was then injected with anesthesia.  The flap was excised through the skin and subcutaneous tissue down to the layer of the underlying musculature.  The interpolation flap was carefully excised within this deep plane to maintain its blood supply.  The edges of the donor site were undermined.   The donor site was closed in a primary fashion.  The pedicle was then rotated into position and sutured.  Once the tube was sutured into place, adequate blood supply was confirmed with blanching and refill.  The pedicle was then wrapped with xeroform gauze and dressed appropriately with a telfa and gauze bandage to ensure continued blood supply and protect the attached pedicle.

## 2021-03-20 NOTE — CHART NOTE - NSCHARTNOTEFT_GEN_A_CORE
POST-OPERATIVE NOTE    Subjective: Patient seen at bedside this afternoon. Reports that she is feeling well, without complaints. Denies nausea, vomiting, chest pain, shortness of breath, or dizziness. Tolerating regular diet.     Patient is s/p rectal EUA with seton placement. Recovering appropriately.     Vital Signs Last 24 Hrs  T(C): 36.6 (20 Mar 2021 13:40), Max: 37.2 (20 Mar 2021 05:48)  T(F): 97.9 (20 Mar 2021 13:40), Max: 98.9 (20 Mar 2021 05:48)  HR: 60 (20 Mar 2021 13:40) (60 - 111)  BP: 104/60 (20 Mar 2021 13:40) (104/60 - 123/77)  BP(mean): 76 (20 Mar 2021 11:45) (76 - 84)  RR: 17 (20 Mar 2021 13:40) (14 - 18)  SpO2: 97% (20 Mar 2021 13:40) (96% - 100%)  I&O's Detail    19 Mar 2021 07:01  -  20 Mar 2021 07:00  --------------------------------------------------------  IN:    IV PiggyBack: 100 mL    Lactated Ringers: 525 mL    Oral Fluid: 120 mL  Total IN: 745 mL    OUT:    Voided (mL): 400 mL  Total OUT: 400 mL    Total NET: 345 mL      20 Mar 2021 07:01  -  20 Mar 2021 14:18  --------------------------------------------------------  IN:    Oral Fluid: 240 mL  Total IN: 240 mL    OUT:    Voided (mL): 300 mL  Total OUT: 300 mL    Total NET: -60 mL        ciprofloxacin     Tablet 500  metroNIDAZOLE    Tablet 500  ciprofloxacin     Tablet 500  enoxaparin Injectable 40  metroNIDAZOLE    Tablet 500    PAST MEDICAL & SURGICAL HISTORY:  Crohn disease      Physical Exam:   GEN: resting in bed comfortably in NAD  RESP: no increased WOB  ABD: soft, non-distended, appropriately tender around incisions without rebound tenderness or guarding  RECTUM: setons in place, non-tender to palpation, no palpable fluid collections or hematomas; stool visible due to leakage  EXTR: warm, well-perfused without gross deformities; spontaneous movement in b/l U/L extrem  NEURO: awake, alert     LABS:                        8.9    18.80 )-----------( 372      ( 20 Mar 2021 06:15 )             29.7     03-20    137  |  104  |  16  ----------------------------<  85  3.9   |  21<L>  |  0.89    Ca    9.1      20 Mar 2021 06:14  Phos  2.9     03-20  Mg     1.8     03-20    TPro  7.5  /  Alb  3.8  /  TBili  0.4  /  DBili  x   /  AST  19  /  ALT  16  /  AlkPhos  86  03-19    PT/INR - ( 20 Mar 2021 06:15 )   PT: 18.5 sec;   INR: 1.58 ratio    PTT - ( 20 Mar 2021 06:15 )  PTT:34.1 sec  CAPILLARY BLOOD GLUCOSE      Assessment:  The patient is a 40y Female who is now several hours post-op from a  rectal EUA with seton placement. Patient tolerated procedure well, recovered in PACU uneventfully, now clinically stable on floors.     Plan:  - Pain control as needed  - DVT ppx  - OOB and ambulating as tolerated  - F/u AM labs  - Regular diet       Makenzie Oro, PGY-1  Red Team Surgery  #[id number] POST-OPERATIVE NOTE    Subjective: Patient seen at bedside this afternoon. Reports that she is feeling well, without complaints. Denies nausea, vomiting, chest pain, shortness of breath, or dizziness. Tolerating regular diet.     Patient is s/p rectal EUA with seton placement. Recovering appropriately.     Vital Signs Last 24 Hrs  T(C): 36.6 (20 Mar 2021 13:40), Max: 37.2 (20 Mar 2021 05:48)  T(F): 97.9 (20 Mar 2021 13:40), Max: 98.9 (20 Mar 2021 05:48)  HR: 60 (20 Mar 2021 13:40) (60 - 111)  BP: 104/60 (20 Mar 2021 13:40) (104/60 - 123/77)  BP(mean): 76 (20 Mar 2021 11:45) (76 - 84)  RR: 17 (20 Mar 2021 13:40) (14 - 18)  SpO2: 97% (20 Mar 2021 13:40) (96% - 100%)  I&O's Detail    19 Mar 2021 07:01  -  20 Mar 2021 07:00  --------------------------------------------------------  IN:    IV PiggyBack: 100 mL    Lactated Ringers: 525 mL    Oral Fluid: 120 mL  Total IN: 745 mL    OUT:    Voided (mL): 400 mL  Total OUT: 400 mL    Total NET: 345 mL      20 Mar 2021 07:01  -  20 Mar 2021 14:18  --------------------------------------------------------  IN:    Oral Fluid: 240 mL  Total IN: 240 mL    OUT:    Voided (mL): 300 mL  Total OUT: 300 mL    Total NET: -60 mL        ciprofloxacin     Tablet 500  metroNIDAZOLE    Tablet 500  ciprofloxacin     Tablet 500  enoxaparin Injectable 40  metroNIDAZOLE    Tablet 500    PAST MEDICAL & SURGICAL HISTORY:  Crohn disease      Physical Exam:   GEN: resting in bed comfortably in NAD  RESP: no increased WOB  ABD: soft, non-distended, appropriately tender around incisions without rebound tenderness or guarding  RECTUM: setons in place, non-tender to palpation, no palpable fluid collections or hematomas; stool visible due to leakage  EXTR: warm, well-perfused without gross deformities; spontaneous movement in b/l U/L extrem  NEURO: awake, alert     LABS:                        8.9    18.80 )-----------( 372      ( 20 Mar 2021 06:15 )             29.7     03-20    137  |  104  |  16  ----------------------------<  85  3.9   |  21<L>  |  0.89    Ca    9.1      20 Mar 2021 06:14  Phos  2.9     03-20  Mg     1.8     03-20    TPro  7.5  /  Alb  3.8  /  TBili  0.4  /  DBili  x   /  AST  19  /  ALT  16  /  AlkPhos  86  03-19    PT/INR - ( 20 Mar 2021 06:15 )   PT: 18.5 sec;   INR: 1.58 ratio    PTT - ( 20 Mar 2021 06:15 )  PTT:34.1 sec  CAPILLARY BLOOD GLUCOSE      Assessment:  The patient is a 40y Female who is now several hours post-op from a  rectal EUA with seton placement. Patient tolerated procedure well, recovered in PACU uneventfully, now clinically stable on floors.     Plan:  - Pain control as needed  - DVT ppx  - OOB and ambulating as tolerated  - F/u AM labs  - Regular diet   - Sitz baths daily      Makenzie Oro, PGY-1  Red Team Surgery  #[id number]

## 2021-03-20 NOTE — BRIEF OPERATIVE NOTE - OPERATION/FINDINGS
fistula tracks probed on left side, 3 fistula tracts identified, one communicating with vagina, 3 setons placed

## 2021-03-21 LAB
ANION GAP SERPL CALC-SCNC: 10 MMOL/L — SIGNIFICANT CHANGE UP (ref 5–17)
APTT BLD: 31.4 SEC — SIGNIFICANT CHANGE UP (ref 27.5–35.5)
BUN SERPL-MCNC: 15 MG/DL — SIGNIFICANT CHANGE UP (ref 7–23)
CALCIUM SERPL-MCNC: 8.3 MG/DL — LOW (ref 8.4–10.5)
CHLORIDE SERPL-SCNC: 105 MMOL/L — SIGNIFICANT CHANGE UP (ref 96–108)
CO2 SERPL-SCNC: 23 MMOL/L — SIGNIFICANT CHANGE UP (ref 22–31)
CREAT SERPL-MCNC: 0.84 MG/DL — SIGNIFICANT CHANGE UP (ref 0.5–1.3)
CULTURE RESULTS: SIGNIFICANT CHANGE UP
GLUCOSE SERPL-MCNC: 90 MG/DL — SIGNIFICANT CHANGE UP (ref 70–99)
HCT VFR BLD CALC: 27.2 % — LOW (ref 34.5–45)
HGB BLD-MCNC: 8.1 G/DL — LOW (ref 11.5–15.5)
INR BLD: 1.53 RATIO — HIGH (ref 0.88–1.16)
MAGNESIUM SERPL-MCNC: 2.2 MG/DL — SIGNIFICANT CHANGE UP (ref 1.6–2.6)
MCHC RBC-ENTMCNC: 22.8 PG — LOW (ref 27–34)
MCHC RBC-ENTMCNC: 29.8 GM/DL — LOW (ref 32–36)
MCV RBC AUTO: 76.6 FL — LOW (ref 80–100)
NRBC # BLD: 0 /100 WBCS — SIGNIFICANT CHANGE UP (ref 0–0)
PHOSPHATE SERPL-MCNC: 2.7 MG/DL — SIGNIFICANT CHANGE UP (ref 2.5–4.5)
PLATELET # BLD AUTO: 369 K/UL — SIGNIFICANT CHANGE UP (ref 150–400)
POTASSIUM SERPL-MCNC: 4 MMOL/L — SIGNIFICANT CHANGE UP (ref 3.5–5.3)
POTASSIUM SERPL-SCNC: 4 MMOL/L — SIGNIFICANT CHANGE UP (ref 3.5–5.3)
PROTHROM AB SERPL-ACNC: 17.7 SEC — HIGH (ref 10.6–13.6)
RBC # BLD: 3.55 M/UL — LOW (ref 3.8–5.2)
RBC # FLD: 17.4 % — HIGH (ref 10.3–14.5)
SODIUM SERPL-SCNC: 138 MMOL/L — SIGNIFICANT CHANGE UP (ref 135–145)
SPECIMEN SOURCE: SIGNIFICANT CHANGE UP
WBC # BLD: 20.53 K/UL — HIGH (ref 3.8–10.5)
WBC # FLD AUTO: 20.53 K/UL — HIGH (ref 3.8–10.5)

## 2021-03-21 RX ORDER — PIPERACILLIN AND TAZOBACTAM 4; .5 G/20ML; G/20ML
3.38 INJECTION, POWDER, LYOPHILIZED, FOR SOLUTION INTRAVENOUS ONCE
Refills: 0 | Status: COMPLETED | OUTPATIENT
Start: 2021-03-21 | End: 2021-03-21

## 2021-03-21 RX ORDER — PIPERACILLIN AND TAZOBACTAM 4; .5 G/20ML; G/20ML
3.38 INJECTION, POWDER, LYOPHILIZED, FOR SOLUTION INTRAVENOUS EVERY 8 HOURS
Refills: 0 | Status: DISCONTINUED | OUTPATIENT
Start: 2021-03-21 | End: 2021-03-22

## 2021-03-21 RX ADMIN — Medication 650 MILLIGRAM(S): at 05:05

## 2021-03-21 RX ADMIN — Medication 500 MILLIGRAM(S): at 05:01

## 2021-03-21 RX ADMIN — PIPERACILLIN AND TAZOBACTAM 25 GRAM(S): 4; .5 INJECTION, POWDER, LYOPHILIZED, FOR SOLUTION INTRAVENOUS at 20:32

## 2021-03-21 RX ADMIN — Medication 650 MILLIGRAM(S): at 21:03

## 2021-03-21 RX ADMIN — Medication 650 MILLIGRAM(S): at 04:35

## 2021-03-21 RX ADMIN — PIPERACILLIN AND TAZOBACTAM 200 GRAM(S): 4; .5 INJECTION, POWDER, LYOPHILIZED, FOR SOLUTION INTRAVENOUS at 16:47

## 2021-03-21 RX ADMIN — ZINC OXIDE 1 APPLICATION(S): 200 OINTMENT TOPICAL at 11:29

## 2021-03-21 RX ADMIN — Medication 650 MILLIGRAM(S): at 20:33

## 2021-03-21 RX ADMIN — ENOXAPARIN SODIUM 40 MILLIGRAM(S): 100 INJECTION SUBCUTANEOUS at 11:28

## 2021-03-21 RX ADMIN — Medication 10 MILLIGRAM(S): at 05:01

## 2021-03-21 RX ADMIN — Medication 500 MILLIGRAM(S): at 13:53

## 2021-03-21 NOTE — PROGRESS NOTE ADULT - ASSESSMENT
41yo Female with Hx of Crohn's (on prednisone) s/p total colectomy with IPAA who presents with perianal abscess. Now POD1 s/p EUA with seton placement x3.      Plan:   - Regular diet  - Abx: Cipro, Flagyl  - Pain control PRN: Tylenol, Ox 2.5/5  - Home meds: prednisone  - Lovenox for VTE ppx  - OOBAAT  - GI c/s today for Crohn's mgmt  - Likely DC home tomorrow    Red Surgery  #[id number]

## 2021-03-21 NOTE — PROGRESS NOTE ADULT - SUBJECTIVE AND OBJECTIVE BOX
Interval Events:    S: Patient doing well, denies fevers, chills, nausea, emesis, chest pain, SOB.    O: Vital Signs  T(C): 36.8 (03-21 @ 08:51), Max: 37.1 (03-20 @ 20:20)  HR: 77 (03-21 @ 08:51) (60 - 84)  BP: 103/64 (03-21 @ 08:51) (92/56 - 106/62)  RR: 18 (03-21 @ 08:51) (17 - 18)  SpO2: 99% (03-21 @ 08:51) (97% - 99%)  03-20-21 @ 07:01  -  03-21-21 @ 07:00  --------------------------------------------------------  IN:  Total IN: 0 mL    OUT:    Voided (mL): 1650 mL  Total OUT: 1650 mL    Total NET: -1650 mL      03-21-21 @ 07:01  -  03-21-21 @ 12:40  --------------------------------------------------------  IN:  Total IN: 0 mL    OUT:    Voided (mL): 800 mL  Total OUT: 800 mL    Total NET: -800 mL        General: alert and oriented, NAD  Resp: airway patent, respirations unlabored  CVS: regular rate and rhythm  Abdomen: soft, nontender, nondistended  Extremities: no edema  Skin: warm, dry, appropriate color                          8.1    20.53 )-----------( 369      ( 21 Mar 2021 06:46 )             27.2   03-21    138  |  105  |  15  ----------------------------<  90  4.0   |  23  |  0.84    Ca    8.3<L>      21 Mar 2021 06:43  Phos  2.7     03-21  Mg     2.2     03-21    TPro  7.5  /  Alb  3.8  /  TBili  0.4  /  DBili  x   /  AST  19  /  ALT  16  /  AlkPhos  86  03-19   Interval Events:    S: Patient seen and examined at bedside and doing well.  Voiding appropriately.  Tolerating diet w/o N/V  States she has some rectal pain still.  Endorses being OOB and ambulating halls.  Denies fevers, chills, nausea, emesis, chest pain, SOB.    O: Vital Signs  T(C): 36.8 (03-21 @ 08:51), Max: 37.1 (03-20 @ 20:20)  HR: 77 (03-21 @ 08:51) (60 - 84)  BP: 103/64 (03-21 @ 08:51) (92/56 - 106/62)  RR: 18 (03-21 @ 08:51) (17 - 18)  SpO2: 99% (03-21 @ 08:51) (97% - 99%)  03-20-21 @ 07:01  -  03-21-21 @ 07:00  --------------------------------------------------------  IN:  Total IN: 0 mL    OUT:    Voided (mL): 1650 mL  Total OUT: 1650 mL    Total NET: -1650 mL      03-21-21 @ 07:01  -  03-21-21 @ 12:40  --------------------------------------------------------  IN:  Total IN: 0 mL    OUT:    Voided (mL): 800 mL  Total OUT: 800 mL    Total NET: -800 mL        GEN: resting in bed comfortably in NAD  RESP: no increased WOB  ABD: soft, non-distended, non-tender without rebound tenderness or guarding  EXTR: warm, well-perfused without gross deformities; spontaneous movement in b/l U/L extrem  NEURO: awake, alert                          8.1    20.53 )-----------( 369      ( 21 Mar 2021 06:46 )             27.2   03-21    138  |  105  |  15  ----------------------------<  90  4.0   |  23  |  0.84    Ca    8.3<L>      21 Mar 2021 06:43  Phos  2.7     03-21  Mg     2.2     03-21    TPro  7.5  /  Alb  3.8  /  TBili  0.4  /  DBili  x   /  AST  19  /  ALT  16  /  AlkPhos  86  03-19

## 2021-03-22 ENCOUNTER — TRANSCRIPTION ENCOUNTER (OUTPATIENT)
Age: 41
End: 2021-03-22

## 2021-03-22 LAB
ANION GAP SERPL CALC-SCNC: 11 MMOL/L — SIGNIFICANT CHANGE UP (ref 5–17)
ANION GAP SERPL CALC-SCNC: 13 MMOL/L — SIGNIFICANT CHANGE UP (ref 5–17)
BUN SERPL-MCNC: 18 MG/DL — SIGNIFICANT CHANGE UP (ref 7–23)
BUN SERPL-MCNC: 20 MG/DL — SIGNIFICANT CHANGE UP (ref 7–23)
CALCIUM SERPL-MCNC: 8.2 MG/DL — LOW (ref 8.4–10.5)
CALCIUM SERPL-MCNC: 8.7 MG/DL — SIGNIFICANT CHANGE UP (ref 8.4–10.5)
CHLORIDE SERPL-SCNC: 104 MMOL/L — SIGNIFICANT CHANGE UP (ref 96–108)
CHLORIDE SERPL-SCNC: 105 MMOL/L — SIGNIFICANT CHANGE UP (ref 96–108)
CO2 SERPL-SCNC: 22 MMOL/L — SIGNIFICANT CHANGE UP (ref 22–31)
CO2 SERPL-SCNC: 23 MMOL/L — SIGNIFICANT CHANGE UP (ref 22–31)
CREAT SERPL-MCNC: 0.85 MG/DL — SIGNIFICANT CHANGE UP (ref 0.5–1.3)
CREAT SERPL-MCNC: 0.94 MG/DL — SIGNIFICANT CHANGE UP (ref 0.5–1.3)
GLUCOSE SERPL-MCNC: 74 MG/DL — SIGNIFICANT CHANGE UP (ref 70–99)
GLUCOSE SERPL-MCNC: 90 MG/DL — SIGNIFICANT CHANGE UP (ref 70–99)
HCT VFR BLD CALC: 29.5 % — LOW (ref 34.5–45)
HGB BLD-MCNC: 8.5 G/DL — LOW (ref 11.5–15.5)
MAGNESIUM SERPL-MCNC: 2.1 MG/DL — SIGNIFICANT CHANGE UP (ref 1.6–2.6)
MCHC RBC-ENTMCNC: 22.6 PG — LOW (ref 27–34)
MCHC RBC-ENTMCNC: 28.8 GM/DL — LOW (ref 32–36)
MCV RBC AUTO: 78.5 FL — LOW (ref 80–100)
NRBC # BLD: 0 /100 WBCS — SIGNIFICANT CHANGE UP (ref 0–0)
PHOSPHATE SERPL-MCNC: 1.7 MG/DL — LOW (ref 2.5–4.5)
PHOSPHATE SERPL-MCNC: 2.5 MG/DL — SIGNIFICANT CHANGE UP (ref 2.5–4.5)
PLATELET # BLD AUTO: 392 K/UL — SIGNIFICANT CHANGE UP (ref 150–400)
POTASSIUM SERPL-MCNC: 3.8 MMOL/L — SIGNIFICANT CHANGE UP (ref 3.5–5.3)
POTASSIUM SERPL-MCNC: 4.2 MMOL/L — SIGNIFICANT CHANGE UP (ref 3.5–5.3)
POTASSIUM SERPL-SCNC: 3.8 MMOL/L — SIGNIFICANT CHANGE UP (ref 3.5–5.3)
POTASSIUM SERPL-SCNC: 4.2 MMOL/L — SIGNIFICANT CHANGE UP (ref 3.5–5.3)
RBC # BLD: 3.76 M/UL — LOW (ref 3.8–5.2)
RBC # FLD: 17.8 % — HIGH (ref 10.3–14.5)
SODIUM SERPL-SCNC: 139 MMOL/L — SIGNIFICANT CHANGE UP (ref 135–145)
SODIUM SERPL-SCNC: 139 MMOL/L — SIGNIFICANT CHANGE UP (ref 135–145)
WBC # BLD: 17.36 K/UL — HIGH (ref 3.8–10.5)
WBC # FLD AUTO: 17.36 K/UL — HIGH (ref 3.8–10.5)

## 2021-03-22 RX ORDER — SODIUM,POTASSIUM PHOSPHATES 278-250MG
1 POWDER IN PACKET (EA) ORAL
Refills: 0 | Status: COMPLETED | OUTPATIENT
Start: 2021-03-22 | End: 2021-03-22

## 2021-03-22 RX ORDER — METRONIDAZOLE 500 MG
500 TABLET ORAL EVERY 8 HOURS
Refills: 0 | Status: DISCONTINUED | OUTPATIENT
Start: 2021-03-22 | End: 2021-03-23

## 2021-03-22 RX ORDER — CIPROFLOXACIN LACTATE 400MG/40ML
500 VIAL (ML) INTRAVENOUS EVERY 12 HOURS
Refills: 0 | Status: DISCONTINUED | OUTPATIENT
Start: 2021-03-22 | End: 2021-03-23

## 2021-03-22 RX ADMIN — Medication 1 TABLET(S): at 17:26

## 2021-03-22 RX ADMIN — Medication 500 MILLIGRAM(S): at 17:27

## 2021-03-22 RX ADMIN — Medication 650 MILLIGRAM(S): at 17:26

## 2021-03-22 RX ADMIN — Medication 650 MILLIGRAM(S): at 12:21

## 2021-03-22 RX ADMIN — ZINC OXIDE 1 APPLICATION(S): 200 OINTMENT TOPICAL at 11:53

## 2021-03-22 RX ADMIN — Medication 650 MILLIGRAM(S): at 17:56

## 2021-03-22 RX ADMIN — Medication 650 MILLIGRAM(S): at 04:55

## 2021-03-22 RX ADMIN — Medication 1 TABLET(S): at 11:51

## 2021-03-22 RX ADMIN — Medication 1 TABLET(S): at 08:33

## 2021-03-22 RX ADMIN — Medication 500 MILLIGRAM(S): at 13:49

## 2021-03-22 RX ADMIN — Medication 10 MILLIGRAM(S): at 04:56

## 2021-03-22 RX ADMIN — Medication 650 MILLIGRAM(S): at 11:51

## 2021-03-22 RX ADMIN — ENOXAPARIN SODIUM 40 MILLIGRAM(S): 100 INJECTION SUBCUTANEOUS at 11:51

## 2021-03-22 RX ADMIN — Medication 650 MILLIGRAM(S): at 05:25

## 2021-03-22 RX ADMIN — Medication 500 MILLIGRAM(S): at 21:05

## 2021-03-22 RX ADMIN — PIPERACILLIN AND TAZOBACTAM 25 GRAM(S): 4; .5 INJECTION, POWDER, LYOPHILIZED, FOR SOLUTION INTRAVENOUS at 04:54

## 2021-03-22 NOTE — PROGRESS NOTE ADULT - SUBJECTIVE AND OBJECTIVE BOX
COLORECTAL SURGERY DAILY PROGRESS NOTE      SUBJECTIVE: Patient seen and examined at bedside and doing well.  Voiding appropriately.  Tolerating diet w/o N/V  Endorses being OOB and ambulating halls.  Denies fevers, chills, nausea, emesis, chest pain, SOB.        OBJECTIVE:  Vital Signs Last 24 Hrs  T(C): 37.1 (22 Mar 2021 04:57), Max: 37.1 (21 Mar 2021 20:37)  T(F): 98.7 (22 Mar 2021 04:57), Max: 98.7 (21 Mar 2021 20:37)  HR: 73 (22 Mar 2021 04:57) (73 - 94)  BP: 106/66 (22 Mar 2021 04:57) (96/62 - 117/74)  BP(mean): --  RR: 16 (22 Mar 2021 04:57) (16 - 18)  SpO2: 99% (22 Mar 2021 04:57) (99% - 100%)    I&O's Summary    21 Mar 2021 07:01  -  22 Mar 2021 07:00  --------------------------------------------------------  IN: 780 mL / OUT: 2250 mL / NET: -1470 mL        Physical Exam:  GEN: resting in bed comfortably in NAD  RESP: no increased WOB  ABD: soft, non-distended, non-tender without rebound tenderness or guarding  EXTR: warm, well-perfused without gross deformities; spontaneous movement in b/l U/L extrem  NEURO: awake, alert      LABS:                        8.5    17.36 )-----------( 392      ( 22 Mar 2021 06:44 )             29.5     03-21    138  |  105  |  15  ----------------------------<  90  4.0   |  23  |  0.84    Ca    8.3<L>      21 Mar 2021 06:43  Phos  2.7     03-21  Mg     2.2     03-21      PT/INR - ( 21 Mar 2021 09:21 )   PT: 17.7 sec;   INR: 1.53 ratio         PTT - ( 21 Mar 2021 09:21 )  PTT:31.4 sec      RADIOLOGY & ADDITIONAL STUDIES:

## 2021-03-22 NOTE — PROGRESS NOTE ADULT - ASSESSMENT
41yo Female with Hx of Crohn's (on prednisone) s/p total colectomy with IPAA who presents with perianal abscess. Now POD2 s/p EUA with seton placement x3.      Plan:   - Regular diet  - Abx: Cipro, Flagyl  - Pain control PRN: Tylenol, Ox 2.5/5  - Home meds: prednisone  - Lovenox for VTE ppx  - OOBAAT  - GI c/s today for Crohn's mgmt  - Likely DC home tomorrow    Red Surgery  #[id number]       41yo Female with Hx of Crohn's (on prednisone) s/p total colectomy with IPAA who presents with perianal abscess. Now POD2 s/p EUA with seton placement x3.      Plan:   - Regular diet  - Abx: Cipro, Flagyl  - Pain control PRN: Tylenol, Ox 2.5/5  - Home meds: prednisone  - Lovenox for VTE ppx  - OOBAAT  - GI c/s today for Crohn's mgmt  - Likely DC home tomorrow pending wbc    Red Surgery  #[id number]

## 2021-03-23 ENCOUNTER — TRANSCRIPTION ENCOUNTER (OUTPATIENT)
Age: 41
End: 2021-03-23

## 2021-03-23 VITALS
HEART RATE: 93 BPM | SYSTOLIC BLOOD PRESSURE: 107 MMHG | RESPIRATION RATE: 18 BRPM | TEMPERATURE: 98 F | OXYGEN SATURATION: 97 % | DIASTOLIC BLOOD PRESSURE: 75 MMHG

## 2021-03-23 LAB
ANION GAP SERPL CALC-SCNC: 14 MMOL/L — SIGNIFICANT CHANGE UP (ref 5–17)
BUN SERPL-MCNC: 14 MG/DL — SIGNIFICANT CHANGE UP (ref 7–23)
CALCIUM SERPL-MCNC: 8.5 MG/DL — SIGNIFICANT CHANGE UP (ref 8.4–10.5)
CHLORIDE SERPL-SCNC: 100 MMOL/L — SIGNIFICANT CHANGE UP (ref 96–108)
CO2 SERPL-SCNC: 22 MMOL/L — SIGNIFICANT CHANGE UP (ref 22–31)
CREAT SERPL-MCNC: 0.84 MG/DL — SIGNIFICANT CHANGE UP (ref 0.5–1.3)
GLUCOSE SERPL-MCNC: 82 MG/DL — SIGNIFICANT CHANGE UP (ref 70–99)
HCT VFR BLD CALC: 36.3 % — SIGNIFICANT CHANGE UP (ref 34.5–45)
HGB BLD-MCNC: 10.4 G/DL — LOW (ref 11.5–15.5)
MAGNESIUM SERPL-MCNC: 2 MG/DL — SIGNIFICANT CHANGE UP (ref 1.6–2.6)
MCHC RBC-ENTMCNC: 22.2 PG — LOW (ref 27–34)
MCHC RBC-ENTMCNC: 28.7 GM/DL — LOW (ref 32–36)
MCV RBC AUTO: 77.4 FL — LOW (ref 80–100)
NRBC # BLD: 0 /100 WBCS — SIGNIFICANT CHANGE UP (ref 0–0)
PHOSPHATE SERPL-MCNC: 4.3 MG/DL — SIGNIFICANT CHANGE UP (ref 2.5–4.5)
PLATELET # BLD AUTO: 485 K/UL — HIGH (ref 150–400)
POTASSIUM SERPL-MCNC: 3.5 MMOL/L — SIGNIFICANT CHANGE UP (ref 3.5–5.3)
POTASSIUM SERPL-SCNC: 3.5 MMOL/L — SIGNIFICANT CHANGE UP (ref 3.5–5.3)
RBC # BLD: 4.69 M/UL — SIGNIFICANT CHANGE UP (ref 3.8–5.2)
RBC # FLD: 18.1 % — HIGH (ref 10.3–14.5)
SODIUM SERPL-SCNC: 136 MMOL/L — SIGNIFICANT CHANGE UP (ref 135–145)
WBC # BLD: 18.67 K/UL — HIGH (ref 3.8–10.5)
WBC # FLD AUTO: 18.67 K/UL — HIGH (ref 3.8–10.5)

## 2021-03-23 PROCEDURE — 81025 URINE PREGNANCY TEST: CPT

## 2021-03-23 PROCEDURE — U0005: CPT

## 2021-03-23 PROCEDURE — 86901 BLOOD TYPING SEROLOGIC RH(D): CPT

## 2021-03-23 PROCEDURE — 85027 COMPLETE CBC AUTOMATED: CPT

## 2021-03-23 PROCEDURE — 96375 TX/PRO/DX INJ NEW DRUG ADDON: CPT

## 2021-03-23 PROCEDURE — 86769 SARS-COV-2 COVID-19 ANTIBODY: CPT

## 2021-03-23 PROCEDURE — 74177 CT ABD & PELVIS W/CONTRAST: CPT

## 2021-03-23 PROCEDURE — 84702 CHORIONIC GONADOTROPIN TEST: CPT

## 2021-03-23 PROCEDURE — 96374 THER/PROPH/DIAG INJ IV PUSH: CPT

## 2021-03-23 PROCEDURE — 85730 THROMBOPLASTIN TIME PARTIAL: CPT

## 2021-03-23 PROCEDURE — 83735 ASSAY OF MAGNESIUM: CPT

## 2021-03-23 PROCEDURE — 99285 EMERGENCY DEPT VISIT HI MDM: CPT | Mod: 25

## 2021-03-23 PROCEDURE — U0003: CPT

## 2021-03-23 PROCEDURE — 86922 COMPATIBILITY TEST ANTIGLOB: CPT

## 2021-03-23 PROCEDURE — C1729: CPT

## 2021-03-23 PROCEDURE — 85025 COMPLETE CBC W/AUTO DIFF WBC: CPT

## 2021-03-23 PROCEDURE — 86900 BLOOD TYPING SEROLOGIC ABO: CPT

## 2021-03-23 PROCEDURE — 80048 BASIC METABOLIC PNL TOTAL CA: CPT

## 2021-03-23 PROCEDURE — 86850 RBC ANTIBODY SCREEN: CPT

## 2021-03-23 PROCEDURE — 84100 ASSAY OF PHOSPHORUS: CPT

## 2021-03-23 PROCEDURE — 87086 URINE CULTURE/COLONY COUNT: CPT

## 2021-03-23 PROCEDURE — 80053 COMPREHEN METABOLIC PANEL: CPT

## 2021-03-23 PROCEDURE — 99231 SBSQ HOSP IP/OBS SF/LOW 25: CPT | Mod: GC

## 2021-03-23 PROCEDURE — 46040 I&D ISCHIORCT&/PERIRCT ABSC: CPT

## 2021-03-23 PROCEDURE — 85610 PROTHROMBIN TIME: CPT

## 2021-03-23 PROCEDURE — 81001 URINALYSIS AUTO W/SCOPE: CPT

## 2021-03-23 RX ORDER — CIPROFLOXACIN LACTATE 400MG/40ML
1 VIAL (ML) INTRAVENOUS
Qty: 28 | Refills: 0
Start: 2021-03-23 | End: 2021-04-05

## 2021-03-23 RX ORDER — METRONIDAZOLE 500 MG
1 TABLET ORAL
Qty: 42 | Refills: 0
Start: 2021-03-23 | End: 2021-04-05

## 2021-03-23 RX ORDER — POTASSIUM CHLORIDE 20 MEQ
20 PACKET (EA) ORAL
Refills: 0 | Status: COMPLETED | OUTPATIENT
Start: 2021-03-23 | End: 2021-03-23

## 2021-03-23 RX ORDER — SODIUM CHLORIDE 9 MG/ML
1000 INJECTION INTRAMUSCULAR; INTRAVENOUS; SUBCUTANEOUS ONCE
Refills: 0 | Status: DISCONTINUED | OUTPATIENT
Start: 2021-03-23 | End: 2021-03-23

## 2021-03-23 RX ORDER — ACETAMINOPHEN 500 MG
2 TABLET ORAL
Qty: 0 | Refills: 0 | DISCHARGE
Start: 2021-03-23

## 2021-03-23 RX ORDER — ZINC OXIDE 200 MG/G
1 OINTMENT TOPICAL
Qty: 1 | Refills: 0
Start: 2021-03-23 | End: 2021-03-29

## 2021-03-23 RX ADMIN — Medication 650 MILLIGRAM(S): at 05:25

## 2021-03-23 RX ADMIN — Medication 500 MILLIGRAM(S): at 05:25

## 2021-03-23 RX ADMIN — Medication 650 MILLIGRAM(S): at 02:08

## 2021-03-23 RX ADMIN — Medication 650 MILLIGRAM(S): at 01:38

## 2021-03-23 RX ADMIN — Medication 10 MILLIGRAM(S): at 05:25

## 2021-03-23 RX ADMIN — Medication 62.5 MILLIMOLE(S): at 01:38

## 2021-03-23 RX ADMIN — Medication 650 MILLIGRAM(S): at 05:55

## 2021-03-23 NOTE — DISCHARGE NOTE PROVIDER - NSDCCPCAREPLAN_GEN_ALL_CORE_FT
PRINCIPAL DISCHARGE DIAGNOSIS  Diagnosis: Ischiorectal abscess  Assessment and Plan of Treatment:

## 2021-03-23 NOTE — DISCHARGE NOTE PROVIDER - NSDCFUADDAPPT_GEN_ALL_CORE_FT
Please make an appointment and follow up outpatient with Dr. Webster in 1 week  Please make an appointment and follow up with your Primary Care Physician in 1-2 weeks

## 2021-03-23 NOTE — DISCHARGE NOTE PROVIDER - NSDCFUADDINST_GEN_ALL_CORE_FT
Please take tylenol around the clock every 4-6 hours as needed for pain. Do not exceed 4,000mg in 24 hours.    You will be going home on 2 weeks of oral antibiotics. Prescriptions were sent to your pharmacy  Your white blood cell count was still elevated whn you left the hospital. You were advised to stay in the hospital for further treatment and were explained the risks of leaving against medical advice. You expressed understanding of the risks and still wished to leave against medical advice. Please come back to the Emergency Department if you experience fever, chills, worsening pain, or drainage from your wound.    Please take tylenol around the clock every 4-6 hours as needed for pain. Do not exceed 4,000mg in 24 hours.    You will be going home on 2 weeks of oral antibiotics. Prescriptions were sent to your pharmacy

## 2021-03-23 NOTE — PROGRESS NOTE ADULT - SUBJECTIVE AND OBJECTIVE BOX
Surgery Progress Note    SUBJECTIVE: Pt seen and examined at bedside. No acute events overnight. This AM, patient comfortable and in no-apparent distress. No nausea, vomiting, diarrhea. Pain is controlled. +Gas/+BM. Tolerating diet.    Vital Signs Last 24 Hrs  T(C): 37 (23 Mar 2021 05:12), Max: 37 (23 Mar 2021 00:46)  T(F): 98.6 (23 Mar 2021 05:12), Max: 98.6 (23 Mar 2021 00:46)  HR: 73 (23 Mar 2021 05:12) (73 - 85)  BP: 130/77 (23 Mar 2021 05:12) (102/66 - 130/77)  BP(mean): --  RR: 18 (23 Mar 2021 05:12) (18 - 18)  SpO2: 99% (23 Mar 2021 05:12) (99% - 100%)    Physical Exam:  General Appearance: Appears well, NAD  Respiratory: No labored breathing  CV: Pulse regularly present  Abdomen: Soft, nontender, nondistended    LABS:                        8.5    17.36 )-----------( 392      ( 22 Mar 2021 06:44 )             29.5     03-22    139  |  105  |  18  ----------------------------<  90  4.2   |  23  |  0.85    Ca    8.7      22 Mar 2021 19:39  Phos  2.5     03-22  Mg     2.1     03-22      PT/INR - ( 21 Mar 2021 09:21 )   PT: 17.7 sec;   INR: 1.53 ratio         PTT - ( 21 Mar 2021 09:21 )  PTT:31.4 sec      INs and OUTs:    03-21-21 @ 07:01  -  03-22-21 @ 07:00  --------------------------------------------------------  IN: 780 mL / OUT: 2250 mL / NET: -1470 mL    03-22-21 @ 07:01  -  03-23-21 @ 05:53  --------------------------------------------------------  IN: 1200 mL / OUT: 0 mL / NET: 1200 mL

## 2021-03-23 NOTE — DISCHARGE NOTE PROVIDER - HOSPITAL COURSE
40 year old female with hx of Crohns (diagnosed ~20 years ago), on prednisone x 6 years (refractory to Humira, Entyvio, allergic to Remicade), s/p total colectomy w IPAA over 8 years ago at [place], known anovaginal fistula followed by Ob/Gyn Dr. Hurtado, sent from Ob/Gyn office for perianal abscess. Patient reports R gluteal swelling x 1 months, worse in past 2 days, associated with pain and pus from anus. Patient denies fevers, denies lightheadedness/dizziness, denies SOB/chest pain, denies nausea/vomiting, denies constipation/diarrhea. CT showed perianal abscess with multiple fistula tracts. Pt was admitted to Dr. Webster, 40 year old female with hx of Crohns (diagnosed ~20 years ago), on prednisone x 6 years (refractory to Humira, Entyvio, allergic to Remicade), s/p total colectomy w IPAA over 8 years ago at [place], known anovaginal fistula followed by Ob/Gyn Dr. Hurtado, sent from Ob/Gyn office for perianal abscess. Patient reports R gluteal swelling x 1 months, worse in past 2 days, associated with pain and pus from anus. Patient denies fevers, denies lightheadedness/dizziness, denies SOB/chest pain, denies nausea/vomiting, denies constipation/diarrhea. CT showed perianal abscess with multiple fistula tracts. Pt was admitted to Dr. Webster, added on fro OR for EUA tomorrow, GI was consulted for crohn's management, IV abx were started. 3/20 pt was brought to the OR with Dr. Webster for seton placement. The procedure went well without any complications. The patient was transferred to the PACU in stable condition. In the PACU, the patients' pain was controlled and vitals stable. The patient was transferred to the surgical floor in stable condition. She became hypotensive on the floor with out becoming symptomatic. Her BP was repeated in 1 hour. GI saw her and recommended continuing w/ prednisone 10mg daily for now. Would not acutely change medical management of Crohn's in this complex patient who has an established GI Dr. at [place] who knows her well. Continue w/ cipro/flagyl. Can consider fecal stream diversion w/ ostomy, but would defer to surgery primary GI DrJoey at [place], Trend WBC and continue Low residue diet. Rectal dsg.  was replaced and zinc ointment was used. 3/21 her WBC is trending up 3/22: Her WBC is elevated but trending down. 3/23: Her WBC *** she will be going home on po abx for 2 weeks, On day of discharge, the patient's vitals are stable, is tolerating a regular diet, voiding appropriately, ambulating well and pain controlled. She will f/u with Dr. Webster in 1 week. Will f/u with her GI doctor in 1-2 weeks. Will f/u with her PCP in 1-2 weeks.     40 year old female with hx of Crohns (diagnosed ~20 years ago), on prednisone x 6 years (refractory to Humira, Entyvio, allergic to Remicade), s/p total colectomy w IPAA over 8 years ago at [place], known anovaginal fistula followed by Ob/Gyn Dr. Hurtado, sent from Ob/Gyn office for perianal abscess. Patient reports R gluteal swelling x 1 months, worse in past 2 days, associated with pain and pus from anus. Patient denies fevers, denies lightheadedness/dizziness, denies SOB/chest pain, denies nausea/vomiting, denies constipation/diarrhea. CT showed perianal abscess with multiple fistula tracts. Pt was admitted to Dr. Webster, added on fro OR for EUA tomorrow, GI was consulted for crohn's management, IV abx were started. 3/20 pt was brought to the OR with Dr. Webster for seton placement. The procedure went well without any complications. The patient was transferred to the PACU in stable condition. In the PACU, the patients' pain was controlled and vitals stable. The patient was transferred to the surgical floor in stable condition. She became hypotensive on the floor with out becoming symptomatic. Her BP was repeated in 1 hour. GI saw her and recommended continuing w/ prednisone 10mg daily for now. Would not acutely change medical management of Crohn's in this complex patient who has an established GI DrJoey at [place] who knows her well. Continue w/ cipro/flagyl. Can consider fecal stream diversion w/ ostomy, but would defer to surgery primary GI  at [place], Trend WBC and continue Low residue diet. Rectal dsg.  was replaced and zinc ointment was used. 3/21 her WBC is trending up 3/22: Her WBC is elevated but trending down. 3/23: Her WBC is still elevated with out becoming febrile or feeling ill. She is refusing to stay in the hospital. Dr. Chávez saw the patient and explained to her the risks of leaving and she still wants to go. She was told to come back tot he ED or f/u outpt if she becomes febrile or starts to feel worse and she agreed. She will be going home on po abx for 2 weeks, On day of discharge, the patient's vitals are stable, is tolerating a regular diet, voiding appropriately, ambulating well and pain controlled. She will f/u with Dr. Webster in 1 week. Will f/u with her GI doctor in 1-2 weeks. Will f/u with her PCP in 1-2 weeks.     40 year old female with hx of Crohns (diagnosed ~20 years ago), on prednisone x 6 years (refractory to Humira, Entyvio, allergic to Remicade), s/p total colectomy w IPAA over 8 years ago at [place], known anovaginal fistula followed by Ob/Gyn Dr. Hurtado, sent from Ob/Gyn office for perianal abscess. Patient reports R gluteal swelling x 1 months, worse in past 2 days, associated with pain and pus from anus. Patient denies fevers, denies lightheadedness/dizziness, denies SOB/chest pain, denies nausea/vomiting, denies constipation/diarrhea. CT showed perianal abscess with multiple fistula tracts. Pt was admitted to Dr. Webster, added on fro OR for EUA tomorrow, GI was consulted for crohn's management, IV abx were started. 3/20 pt was brought to the OR with Dr. Webster for seton placement. The procedure went well without any complications. The patient was transferred to the PACU in stable condition. In the PACU, the patients' pain was controlled and vitals stable. The patient was transferred to the surgical floor in stable condition. She became hypotensive on the floor with out becoming symptomatic. Her BP was repeated in 1 hour. GI saw her and recommended continuing w/ prednisone 10mg daily for now. Would not acutely change medical management of Crohn's in this complex patient who has an established GI DrJoey at [place] who knows her well. Continue w/ cipro/flagyl. Can consider fecal stream diversion w/ ostomy, but would defer to surgery primary GI  at [place], Trend WBC and continue Low residue diet. Rectal dsg.  was replaced and zinc ointment was used. 3/21 her WBC is trending up 3/22: Her WBC is elevated but trending down. 3/23: Her WBC is still elevated with out becoming febrile or feeling ill. She is refusing to stay in the hospital. Dr. Chávez saw the patient and explained to her the risks of leaving and she still wants to go. She was told to come back tot he ED or f/u outpt if she becomes febrile or starts to feel worse and she agreed. She will be going home on po abx for 2 weeks, On day of d, the patient's vitals are stable, is tolerating a regular diet, voiding appropriately, ambulating well and pain controlled. She will f/u with Dr. Webster in 1 week. Will f/u with her GI doctor in 1-2 weeks. Will f/u with her PCP in 1-2 weeks.

## 2021-03-23 NOTE — PROGRESS NOTE ADULT - ATTENDING COMMENTS
41yo Female with Hx of Crohn's (on prednisone) s/p total colectomy with IPAA who presents with perianal abscess. Now s/p EUA with seton placement x3 3/20.    Improving daily.  Disposition pending downtrending WBC

## 2021-03-23 NOTE — DISCHARGE NOTE PROVIDER - CARE PROVIDER_API CALL
Te Webster)  Surgery  [address], [address]  [place] [address]  Phone: [phone]  Fax: [phone]  Follow Up Time: 1 week

## 2021-03-23 NOTE — CHART NOTE - NSCHARTNOTEFT_GEN_A_CORE
39yo Female with Hx of Crohn's (on prednisone) s/p total colectomy with IPAA who presents with perianal abscess. Now s/p EUA with seton placement x3 3/20.    Patient has had persistently elevated WBC post-operatively, despite being on Cipro/Flagyl. This AM, her WBC was elevated from 17.36 to 18.67 along with her h&h and platelet count. Planned to give her a bolus and recheck labs, however patient was not amenable and wanted to leave. We discussed the concern of infection in the setting of recent EUA and the risks of leaving. The patient expressed understanding about the risks of leaving and the symptoms that she would need to seek medical care for. She was sent 2 weeks of Cipro/Flagyl and told to continue her Prednisone 10mg daily. She was instructed to follow up with Dr. Webster. Patient signed the AMA form and her IV was removed.    Discussed with Dr. Darin Gurrola, PGY1  [facility] [phone]

## 2021-03-23 NOTE — DISCHARGE NOTE NURSING/CASE MANAGEMENT/SOCIAL WORK - PATIENT PORTAL LINK FT
You can access the FollowMyHealth Patient Portal offered by [facility] by registering at the following website: [email or web address]. By joining [facility]’s FollowMyHealth portal, you will also be able to view your health information using other applications (apps) compatible with our system.

## 2021-03-23 NOTE — PROGRESS NOTE ADULT - ASSESSMENT
41yo Female with Hx of Crohn's (on prednisone) s/p total colectomy with IPAA who presents with perianal abscess. Now s/p EUA with seton placement x3 3/20.      Plan:   - Regular diet  - Abx: Cipro, Flagyl  - Pain control PRN: Tylenol, Ox 2.5/5  - Home meds: prednisone  - Lovenox for VTE ppx  - OOBAAT  - GI recs: c/w cipro/flagyl, prednisone 10  - Likely DC home today pending wbc    [facility]  #[id number]

## 2021-03-23 NOTE — DISCHARGE NOTE PROVIDER - NSDCMRMEDTOKEN_GEN_ALL_CORE_FT
predniSONE 10 mg oral tablet: 1 tab(s) orally once a day   acetaminophen 325 mg oral tablet: 2 tab(s) orally every 6 hours  ciprofloxacin 500 mg oral tablet: 1 tab(s) orally every 12 hours  metroNIDAZOLE 500 mg oral tablet: 1 tab(s) orally every 8 hours  predniSONE 10 mg oral tablet: 1 tab(s) orally once a day  zinc oxide 20% topical ointment: 1 application topically once a day

## 2021-03-25 ENCOUNTER — NON-APPOINTMENT (OUTPATIENT)
Age: 41
End: 2021-03-25

## 2021-03-26 ENCOUNTER — NON-APPOINTMENT (OUTPATIENT)
Age: 41
End: 2021-03-26

## 2021-03-28 ENCOUNTER — NON-APPOINTMENT (OUTPATIENT)
Age: 41
End: 2021-03-28

## 2021-03-28 LAB
CYTOLOGY CVX/VAG DOC THIN PREP: NORMAL
HPV HIGH+LOW RISK DNA PNL CVX: NOT DETECTED

## 2021-03-29 ENCOUNTER — NON-APPOINTMENT (OUTPATIENT)
Age: 41
End: 2021-03-29

## 2021-04-26 PROBLEM — K50.90 CROHN'S DISEASE, UNSPECIFIED, WITHOUT COMPLICATIONS: Chronic | Status: ACTIVE | Noted: 2021-03-19

## 2021-04-29 ENCOUNTER — APPOINTMENT (OUTPATIENT)
Dept: SURGERY | Facility: CLINIC | Age: 41
End: 2021-04-29
Payer: MEDICARE

## 2021-04-29 PROCEDURE — 99024 POSTOP FOLLOW-UP VISIT: CPT

## 2021-04-30 NOTE — HISTORY OF PRESENT ILLNESS
[FreeTextEntry1] : Ms. Sunitha Mcqueen is a 40y.o. 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 Imaging demonstrate multiple fistula tracts and an abscess and so she was taken to the OR on 3/23/21 for an EUA, I&D and placement of 3 setons. Post-op initially her WBC went up but once it was going back down she was discharged home on PO abx. Since the hospital she states 1 of the setons fell out but otherwise no changes or issues. No fevers or chills.

## 2021-04-30 NOTE — PHYSICAL EXAM
[Excoriation] : excoriations [Fistula] : a fistula [JVD] : no jugular venous distention  [Normal Rate and Rhythm] : normal rate and rhythm [Alert] : alert [Oriented to Person] : oriented to person [Oriented to Place] : oriented to place [Oriented to Time] : oriented to time [de-identified] : significant excoriation of the skin w/ stool soilage, rectovaginal and R anal fistula setons in place, no fluctuance [de-identified] : soft, non-distended [de-identified] : awake, alert, in NAD [de-identified] : normocephalic, atraumatic, EOMI, nl conjunctiva [de-identified] : b/l chest rise, EWOB on RA [de-identified] : significant uterine/cervical prolapse [de-identified] : normal strength [de-identified] : excoriated perianal skin as above [de-identified] : normal mood and affect

## 2021-05-20 ENCOUNTER — APPOINTMENT (OUTPATIENT)
Dept: SURGERY | Facility: CLINIC | Age: 41
End: 2021-05-20

## 2021-06-24 PROBLEM — R92.2 DENSE BREASTS: Status: ACTIVE | Noted: 2021-06-24

## 2021-07-26 DIAGNOSIS — N60.02 SOLITARY CYST OF LEFT BREAST: ICD-10-CM

## 2022-01-27 ENCOUNTER — NON-APPOINTMENT (OUTPATIENT)
Age: 42
End: 2022-01-27

## 2022-01-28 ENCOUNTER — APPOINTMENT (OUTPATIENT)
Dept: PHYSICAL MEDICINE AND REHAB | Facility: CLINIC | Age: 42
End: 2022-01-28
Payer: MEDICARE

## 2022-01-28 ENCOUNTER — RESULT REVIEW (OUTPATIENT)
Age: 42
End: 2022-01-28

## 2022-01-28 ENCOUNTER — NON-APPOINTMENT (OUTPATIENT)
Age: 42
End: 2022-01-28

## 2022-01-28 VITALS
SYSTOLIC BLOOD PRESSURE: 117 MMHG | HEART RATE: 86 BPM | TEMPERATURE: 97.3 F | DIASTOLIC BLOOD PRESSURE: 73 MMHG | OXYGEN SATURATION: 99 %

## 2022-01-28 PROCEDURE — 99203 OFFICE O/P NEW LOW 30 MIN: CPT

## 2022-01-28 RX ORDER — CLOTRIMAZOLE AND BETAMETHASONE DIPROPIONATE 10; .5 MG/G; MG/G
1-0.05 CREAM TOPICAL
Qty: 45 | Refills: 1 | Status: DISCONTINUED | COMMUNITY
Start: 2018-05-17 | End: 2022-01-28

## 2022-01-28 NOTE — HISTORY OF PRESENT ILLNESS
[Back] : back [___ yrs] : [unfilled] year(s) ago [5] : a current pain level of 5/10 [9] : a maximum pain level of 9/10 [Dull] : dull [Aching] : aching [Laying] : laying [Sitting] : sitting [Standing] : standing [Walking] : walking [FreeTextEntry3] : sit to stand

## 2022-01-28 NOTE — ASSESSMENT
[FreeTextEntry1] : 41 year old female with low back pain and coccydynia.  We will obtain X-ray of the sacrum/coccyx to rule out fracture.  She will return to see me once she has completed the study so that we may review the results and discuss her further treatment options.

## 2022-01-28 NOTE — PHYSICAL EXAM
[Normal] : Deep tendon reflexes were 2+ and symmetric, the sensory exam was normal to light touch and pinprick and no focal deficits [de-identified] : tenderness to palpation over coccyx [Straight-Leg Raise Test - Left] : straight leg raise of the left leg was negative [Straight-Leg Raise Test - Right] : straight leg raise  of the right leg was negative [Able to toe walk] : the patient was able to toe walk [Able to heel walk] : the patient was able to heel walk

## 2022-02-01 ENCOUNTER — OUTPATIENT (OUTPATIENT)
Dept: OUTPATIENT SERVICES | Facility: HOSPITAL | Age: 42
LOS: 1 days | End: 2022-02-01
Payer: MEDICARE

## 2022-02-01 ENCOUNTER — APPOINTMENT (OUTPATIENT)
Dept: RADIOLOGY | Facility: CLINIC | Age: 42
End: 2022-02-01
Payer: MEDICARE

## 2022-02-01 DIAGNOSIS — M53.3 SACROCOCCYGEAL DISORDERS, NOT ELSEWHERE CLASSIFIED: ICD-10-CM

## 2022-02-01 PROCEDURE — 72220 X-RAY EXAM SACRUM TAILBONE: CPT | Mod: 26

## 2022-02-01 PROCEDURE — 72220 X-RAY EXAM SACRUM TAILBONE: CPT

## 2022-03-01 ENCOUNTER — APPOINTMENT (OUTPATIENT)
Dept: PHYSICAL MEDICINE AND REHAB | Facility: CLINIC | Age: 42
End: 2022-03-01
Payer: MEDICARE

## 2022-03-01 VITALS
HEART RATE: 95 BPM | OXYGEN SATURATION: 95 % | TEMPERATURE: 97.2 F | DIASTOLIC BLOOD PRESSURE: 70 MMHG | SYSTOLIC BLOOD PRESSURE: 109 MMHG

## 2022-03-01 PROCEDURE — 99213 OFFICE O/P EST LOW 20 MIN: CPT

## 2022-03-02 ENCOUNTER — TRANSCRIPTION ENCOUNTER (OUTPATIENT)
Age: 42
End: 2022-03-02

## 2022-03-09 NOTE — ASSESSMENT
[FreeTextEntry1] : 41 year old female with low back pain and coccydynia.  Given the nature of her complaints, we did discuss ganglion impar block.  Risks, benefits, and expectations of the procedure were reviewed.  The patient was provided with an educational pamphlet outlining the details of the procedure so that he/she may have the ability to review the information prior to proceeding.  The patient will decide whether or not she would like to proceed and will follow up with me for the procedure.\par

## 2022-03-09 NOTE — HISTORY OF PRESENT ILLNESS
[FreeTextEntry1] : Patient returns after completing X-ray of the sacrum and coccyx.  She continues to be in pain.  She is here to review the results.

## 2022-03-09 NOTE — DATA REVIEWED
[Plain X-Rays] : plain X-Rays [FreeTextEntry1] : \par  XR SACRUM AND COCCYX 2 VIEWS             Final\par \par No Documents Attached\par \par \par \par \par   EXAM:  XR SACRUM COCCYX MIN 2 VIEWS\par \par \par PROCEDURE DATE:  02/01/2022\par \par \par \par INTERPRETATION:  CLINICAL INDICATION: sacrococcygeal pain\par \par EXAM:\par AP and lateral sacrum and coccyx from 2/1/2022 at 1613. Compared to appearance on abdomen/pelvis CT from 3/19/2021.\par \par IMPRESSION:\par No acute posttraumatic or focally aggressive sacrococcygeal abnormality.\par \par Symmetric normally aligned SI joints with preserved joint spaces and intact articular cortical margins.\par \par Normally aligned and spaced pubic symphysis.\par \par Unremarkable partially visualized hips, pelvic osseous structures, and lower lumbar spine.\par \par No discrete lytic or blastic lesions.\par \par Curved string-like material noted in vulvar region, correlate clinically.\par \par --- End of Report ---\par \par \par \par \par \par \par CLAUDETTE CHONG MD; Attending Radiologist\par This document has been electronically signed. Feb 1 2022  6:32PM\par \par  \par \par  Ordered by: ANNE MARIE CAMERON       Collected/Examined: 01Feb2022 04:13PM       \par Verified by: ANNE MARIE CAMERON 02Feb2022 10:31AM       \par  Result Communication: Schedule appointment to discuss results;\par Stage: Final       \par  Performed at: [facility]       Resulted: 01Feb2022 06:32PM       Last Updated: 02Feb2022 10:31AM       Accession: [id number]

## 2022-03-15 ENCOUNTER — TRANSCRIPTION ENCOUNTER (OUTPATIENT)
Age: 42
End: 2022-03-15

## 2022-03-15 LAB — SARS-COV-2 N GENE NPH QL NAA+PROBE: NOT DETECTED

## 2022-03-17 ENCOUNTER — APPOINTMENT (OUTPATIENT)
Dept: PHYSICAL MEDICINE AND REHAB | Facility: CLINIC | Age: 42
End: 2022-03-17
Payer: MEDICARE

## 2022-03-17 ENCOUNTER — OUTPATIENT (OUTPATIENT)
Dept: OUTPATIENT SERVICES | Facility: HOSPITAL | Age: 42
LOS: 1 days | End: 2022-03-17
Payer: MEDICARE

## 2022-03-17 DIAGNOSIS — M54.16 RADICULOPATHY, LUMBAR REGION: ICD-10-CM

## 2022-03-17 PROCEDURE — 64999 UNLISTED PX NERVOUS SYSTEM: CPT

## 2022-03-18 DIAGNOSIS — M53.3 SACROCOCCYGEAL DISORDERS, NOT ELSEWHERE CLASSIFIED: ICD-10-CM

## 2022-03-18 DIAGNOSIS — M54.50 LOW BACK PAIN, UNSPECIFIED: ICD-10-CM

## 2022-04-06 ENCOUNTER — APPOINTMENT (OUTPATIENT)
Dept: PHYSICAL MEDICINE AND REHAB | Facility: CLINIC | Age: 42
End: 2022-04-06
Payer: MEDICARE

## 2022-04-06 VITALS — DIASTOLIC BLOOD PRESSURE: 82 MMHG | OXYGEN SATURATION: 98 % | HEART RATE: 79 BPM | SYSTOLIC BLOOD PRESSURE: 125 MMHG

## 2022-04-06 DIAGNOSIS — G89.29 LOW BACK PAIN, UNSPECIFIED: ICD-10-CM

## 2022-04-06 DIAGNOSIS — M54.50 LOW BACK PAIN, UNSPECIFIED: ICD-10-CM

## 2022-04-06 DIAGNOSIS — M53.3 SACROCOCCYGEAL DISORDERS, NOT ELSEWHERE CLASSIFIED: ICD-10-CM

## 2022-04-06 PROCEDURE — 99213 OFFICE O/P EST LOW 20 MIN: CPT

## 2022-04-06 NOTE — HISTORY OF PRESENT ILLNESS
[FreeTextEntry1] : Patient returns after her ganglion impar block.  She only felt mild relief of her pain for a couple of hours following the procedure.  She is back to square one.  She is here to discuss.

## 2022-04-06 NOTE — ASSESSMENT
[FreeTextEntry1] : 41 year old female with low back pain and coccydynia.  As we have only achieved minimal temporary improvement following her first ganglion impar block, we will obtain new MRI sacrum/coccyx to help delineate any additional pathology.  She will follow up with me after she has completed the study so that we may review the results and discuss her further treatment options.

## 2022-04-20 ENCOUNTER — NON-APPOINTMENT (OUTPATIENT)
Age: 42
End: 2022-04-20

## 2022-04-21 ENCOUNTER — APPOINTMENT (OUTPATIENT)
Dept: MRI IMAGING | Facility: CLINIC | Age: 42
End: 2022-04-21
Payer: MEDICARE

## 2022-04-21 PROCEDURE — 72197 MRI PELVIS W/O & W/DYE: CPT

## 2022-04-21 PROCEDURE — A9585: CPT

## 2022-04-26 ENCOUNTER — RESULT REVIEW (OUTPATIENT)
Age: 42
End: 2022-04-26

## 2022-05-11 ENCOUNTER — APPOINTMENT (OUTPATIENT)
Dept: PHYSICAL MEDICINE AND REHAB | Facility: CLINIC | Age: 42
End: 2022-05-11

## 2022-07-25 ENCOUNTER — NON-APPOINTMENT (OUTPATIENT)
Age: 42
End: 2022-07-25

## 2022-11-29 ENCOUNTER — NON-APPOINTMENT (OUTPATIENT)
Age: 42
End: 2022-11-29

## 2024-03-08 ENCOUNTER — NON-APPOINTMENT (OUTPATIENT)
Age: 44
End: 2024-03-08

## 2024-04-04 NOTE — ED ADULT NURSE NOTE - CHIEF COMPLAINT
Called and left vm for patient to call back to reschedule appt as heart cath was moved.   
Patient called back, I rescheduled him to 04/23/24 at 10:45am for Heart Cath results  
The patient is a 40y Female complaining of